# Patient Record
Sex: FEMALE | Race: WHITE | NOT HISPANIC OR LATINO | Employment: OTHER | URBAN - METROPOLITAN AREA
[De-identification: names, ages, dates, MRNs, and addresses within clinical notes are randomized per-mention and may not be internally consistent; named-entity substitution may affect disease eponyms.]

---

## 2017-11-30 ENCOUNTER — TRANSFERRED RECORDS (OUTPATIENT)
Dept: HEALTH INFORMATION MANAGEMENT | Facility: CLINIC | Age: 67
End: 2017-11-30

## 2017-12-06 ENCOUNTER — TRANSFERRED RECORDS (OUTPATIENT)
Dept: HEALTH INFORMATION MANAGEMENT | Facility: CLINIC | Age: 67
End: 2017-12-06

## 2017-12-18 ENCOUNTER — TRANSFERRED RECORDS (OUTPATIENT)
Dept: HEALTH INFORMATION MANAGEMENT | Facility: CLINIC | Age: 67
End: 2017-12-18

## 2017-12-27 ENCOUNTER — TRANSFERRED RECORDS (OUTPATIENT)
Dept: HEALTH INFORMATION MANAGEMENT | Facility: CLINIC | Age: 67
End: 2017-12-27

## 2017-12-27 PROCEDURE — 00000346 ZZHCL STATISTIC REVIEW OUTSIDE SLIDES TC 88321: Performed by: OBSTETRICS & GYNECOLOGY

## 2017-12-28 LAB — COPATH REPORT: NORMAL

## 2017-12-29 ENCOUNTER — TRANSFERRED RECORDS (OUTPATIENT)
Dept: HEALTH INFORMATION MANAGEMENT | Facility: CLINIC | Age: 67
End: 2017-12-29

## 2018-01-05 ENCOUNTER — ONCOLOGY VISIT (OUTPATIENT)
Dept: ONCOLOGY | Facility: CLINIC | Age: 68
End: 2018-01-05
Attending: OBSTETRICS & GYNECOLOGY
Payer: COMMERCIAL

## 2018-01-05 ENCOUNTER — APPOINTMENT (OUTPATIENT)
Dept: LAB | Facility: CLINIC | Age: 68
End: 2018-01-05

## 2018-01-05 VITALS
OXYGEN SATURATION: 97 % | HEIGHT: 64 IN | DIASTOLIC BLOOD PRESSURE: 75 MMHG | TEMPERATURE: 98.8 F | WEIGHT: 124.1 LBS | SYSTOLIC BLOOD PRESSURE: 126 MMHG | BODY MASS INDEX: 21.19 KG/M2 | HEART RATE: 80 BPM

## 2018-01-05 DIAGNOSIS — C55 UTERINE CARCINOSARCOMA (H): Primary | ICD-10-CM

## 2018-01-05 LAB
ALBUMIN SERPL-MCNC: 3.6 G/DL (ref 3.4–5)
ALP SERPL-CCNC: 79 U/L (ref 40–150)
ALT SERPL W P-5'-P-CCNC: 17 U/L (ref 0–50)
ANION GAP SERPL CALCULATED.3IONS-SCNC: 9 MMOL/L (ref 3–14)
AST SERPL W P-5'-P-CCNC: 17 U/L (ref 0–45)
BASOPHILS # BLD AUTO: 0.1 10E9/L (ref 0–0.2)
BASOPHILS NFR BLD AUTO: 0.7 %
BILIRUB SERPL-MCNC: 0.2 MG/DL (ref 0.2–1.3)
BUN SERPL-MCNC: 8 MG/DL (ref 7–30)
CALCIUM SERPL-MCNC: 8.8 MG/DL (ref 8.5–10.1)
CHLORIDE SERPL-SCNC: 107 MMOL/L (ref 94–109)
CO2 SERPL-SCNC: 22 MMOL/L (ref 20–32)
CREAT SERPL-MCNC: 0.53 MG/DL (ref 0.52–1.04)
DIFFERENTIAL METHOD BLD: ABNORMAL
EOSINOPHIL # BLD AUTO: 0.1 10E9/L (ref 0–0.7)
EOSINOPHIL NFR BLD AUTO: 0.8 %
ERYTHROCYTE [DISTWIDTH] IN BLOOD BY AUTOMATED COUNT: 12.3 % (ref 10–15)
GFR SERPL CREATININE-BSD FRML MDRD: >90 ML/MIN/1.7M2
GLUCOSE SERPL-MCNC: 157 MG/DL (ref 70–99)
HCT VFR BLD AUTO: 40.4 % (ref 35–47)
HGB BLD-MCNC: 13.6 G/DL (ref 11.7–15.7)
IMM GRANULOCYTES # BLD: 0 10E9/L (ref 0–0.4)
IMM GRANULOCYTES NFR BLD: 0.3 %
LYMPHOCYTES # BLD AUTO: 2 10E9/L (ref 0.8–5.3)
LYMPHOCYTES NFR BLD AUTO: 27.9 %
MCH RBC QN AUTO: 33.3 PG (ref 26.5–33)
MCHC RBC AUTO-ENTMCNC: 33.7 G/DL (ref 31.5–36.5)
MCV RBC AUTO: 99 FL (ref 78–100)
MONOCYTES # BLD AUTO: 0.5 10E9/L (ref 0–1.3)
MONOCYTES NFR BLD AUTO: 7.2 %
NEUTROPHILS # BLD AUTO: 4.5 10E9/L (ref 1.6–8.3)
NEUTROPHILS NFR BLD AUTO: 63.1 %
NRBC # BLD AUTO: 0 10*3/UL
NRBC BLD AUTO-RTO: 0 /100
PLATELET # BLD AUTO: 327 10E9/L (ref 150–450)
POTASSIUM SERPL-SCNC: 3.6 MMOL/L (ref 3.4–5.3)
PROT SERPL-MCNC: 7.4 G/DL (ref 6.8–8.8)
RBC # BLD AUTO: 4.08 10E12/L (ref 3.8–5.2)
SODIUM SERPL-SCNC: 138 MMOL/L (ref 133–144)
WBC # BLD AUTO: 7.2 10E9/L (ref 4–11)

## 2018-01-05 PROCEDURE — 80053 COMPREHEN METABOLIC PANEL: CPT | Performed by: OBSTETRICS & GYNECOLOGY

## 2018-01-05 PROCEDURE — G0463 HOSPITAL OUTPT CLINIC VISIT: HCPCS | Mod: ZF

## 2018-01-05 PROCEDURE — 99205 OFFICE O/P NEW HI 60 MIN: CPT | Mod: ZP | Performed by: OBSTETRICS & GYNECOLOGY

## 2018-01-05 PROCEDURE — 85025 COMPLETE CBC W/AUTO DIFF WBC: CPT | Performed by: OBSTETRICS & GYNECOLOGY

## 2018-01-05 RX ORDER — HEPARIN SODIUM 5000 [USP'U]/.5ML
5000 INJECTION, SOLUTION INTRAVENOUS; SUBCUTANEOUS
Status: CANCELLED | OUTPATIENT
Start: 2018-01-05

## 2018-01-05 RX ORDER — PHENAZOPYRIDINE HYDROCHLORIDE 200 MG/1
200 TABLET, FILM COATED ORAL ONCE
Status: CANCELLED | OUTPATIENT
Start: 2018-01-05 | End: 2018-01-05

## 2018-01-05 ASSESSMENT — PAIN SCALES - GENERAL: PAINLEVEL: NO PAIN (0)

## 2018-01-05 NOTE — MR AVS SNAPSHOT
After Visit Summary   1/5/2018    Cristin Ibarra    MRN: [id number]           Patient Information     Date Of Birth          1950        Visit Information        Provider Department      1/5/2018 12:00 PM Luis Hayes MD [facility]        Today's Diagnoses     Uterine carcinosarcoma (H)    -  1       Follow-ups after your visit        Your next 10 appointments already scheduled     Jan 12, 2018   Procedure with Luis Hayes MD   [facility], [place], Same Day Surgery (--)    [address]  [place] [address] [phone]              Who to contact     If you have questions or need follow up information about today's clinic visit or your schedule please contact [facility] directly at [phone].  Normal or non-critical lab and imaging results will be communicated to you by MyChart, letter or phone within 4 business days after the clinic has received the results. If you do not hear from us within 7 days, please contact the clinic through MyChart or phone. If you have a critical or abnormal lab result, we will notify you by phone as soon as possible.  Submit refill requests through [employer] or call your pharmacy and they will forward the refill request to us. Please allow 3 business days for your refill to be completed.          Additional Information About Your Visit        MyChart Information     [employer] gives you secure access to your electronic health record. If you see a primary care provider, you can also send messages to your care team and make appointments. If you have questions, please call your primary care clinic.  If you do not have a primary care provider, please call [phone] and they will assist you.        Care EveryWhere ID     This is your Care EveryWhere ID. This could be used by other organizations to access your [place] medical records  [id number]        Your Vitals Were     Pulse Temperature Height Pulse Oximetry  "Breastfeeding? BMI (Body Mass Index)    80 98.8  F (37.1  C) (Oral) 1.626 m (5' 4\") 97% No 21.3 kg/m2       Blood Pressure from Last 3 Encounters:   01/12/18 115/70   01/05/18 126/75    Weight from Last 3 Encounters:   01/12/18 55.7 kg (122 lb 12.7 oz)   01/05/18 56.3 kg (124 lb 1.6 oz)              We Performed the Following     CBC with platelets differential     CMP - Comprehensive Metabolic Panel     Marybel-Operative Worksheet - Erin Total Laparoscopic Hysterectomy, bilateral, Salpingo-Oophorectomy, cystoscopy, possible open, possible lymph node dissection        Primary Care Provider Fax #    Physician No Ref-Primary [phone]       [address] [address]        Equal Access to Services     MICHAEL SMALLWOOD : Kristin Kinney, wamaynor marquez, terell kaalmajosé cox, vicki courtney . So [facility] [phone].    ATENCIÓN: Si habla español, tiene a hayes disposición servicios gratuitos de asistencia lingüística. [facility] [phone].    We comply with applicable federal civil rights laws and [place] laws. We do not discriminate on the basis of race, color, national origin, age, disability, sex, sexual orientation, or gender identity.            Thank you!     Thank you for choosing [facility]  for your care. Our goal is always to provide you with excellent care. Hearing back from our patients is one way we can continue to improve our services. Please take a few minutes to complete the written survey that you may receive in the mail after your visit with us. Thank you!             Your Updated Medication List - Protect others around you: Learn how to safely use, store and throw away your medicines at www.disposemymeds.org.          This list is accurate as of: 1/5/18 11:59 PM.  Always use your most recent med list.                   Brand Name Dispense Instructions for use Diagnosis    CALCIUM/VITAMIN D PO      Take 1 " tablet by mouth daily        MULTIVITAMIN PO      Take 1 tablet by mouth daily        RA VITAMIN C/MAJO HIPS 1000 MG Tabs tablet   Generic drug:  ascorbic acid

## 2018-01-05 NOTE — LETTER
2018       RE: Cristin Ibarra  [address] [place] [address]     Dear Colleague,    Thank you for referring your patient, Cristin Ibarra, to the [facility]. Please see a copy of my visit note below.                            Consult Notes on Referred Patient    Date: 2018       Dr. Deborah Maldonado,   [facility]  [address]  [place] [address]       RE: Cristin Ibarra  : 1950  JEMAL: 2018    Dear Dr. Deborah Maldonado:    I had the pleasure of seeing your patient Cristin Ibarra here at the Gynecologic Cancer Clinic at the [facility] on 2018.  As you know she is a very pleasant 67 year old woman with a recent diagnosis of uterine carcinosarcoma.  Given these findings she was subsequently sent to the Gynecologic Cancer Clinic for new patient consultation.    After presenting to you with complaint of 3 months of post menopausal bleeding you performed endometrial biopsy showing carcinosarcoma.  Our review of the pathology at [facility] shows the same diagnosis.  She has had no associated symptoms and feels well.  Her bleeding hs subsided since your biopsy.      Review of Systems:    Systemic           no weight changes; no fever; no chills; no night sweats; no appetite changes  Skin           no rashes, or lesions  Eye           no irritation; no changes in vision  Clemencia-Laryngeal           no dysphagia; no hoarseness   Pulmonary    no cough; no shortness of breath  Cardiovascular    no chest pain; no palpitations  Gastrointestinal    no diarrhea; no constipation; no abdominal pain; no changes in bowel  habits; no blood in stool  Genitourinary   no urinary frequency; no urinary urgency; no dysuria; no pain; no abnormal vaginal discharge; she has abnormal vaginal bleeding as described above  Breast   no breast discharge; no breast changes; no breast pain  Musculoskeletal    no myalgias; no arthralgias; no back  "pain  Psychiatric           no depressed mood; no anxiety    Hematologic           no tender lymph nodes; no noticeable swellings or lumps   Endocrine    no hot flashes; no heat/cold intolerance         Neurological   no tremor; no numbness and tingling; no headaches; no difficulty  sleeping      Past Medical History:    No past medical history on file.      Past Surgical History:    No past surgical history on file.      Health Maintenance:  Health Maintenance Due   Topic Date Due     TETANUS IMMUNIZATION (SYSTEM ASSIGNED)  10/20/1970     HEPATITIS C SCREENING  10/20/1970     LIPID SCREEN Q5 YR FEMALE (SYSTEM ASSIGNED)  10/20/1997     MAMMO SCREEN Q2 YR (SYSTEM ASSIGNED)  10/20/2002     COLON CANCER SCREEN (SYSTEM ASSIGNED)  10/20/2002     ADVANCE DIRECTIVE PLANNING Q5 YRS  10/20/2007     INFLUENZA VACCINE (SYSTEM ASSIGNED)  09/01/2017     FALL RISK ASSESSMENT  10/20/2017     DEXA SCAN SCREENING (SYSTEM ASSIGNED)  10/20/2017     PNEUMOCOCCAL (1 of 2 - PCV13) 10/20/2017       Last Pap Smear: At this evaluation and it is co-test double negative            Current Medications:     has a current medication list which includes the following prescription(s): calcium carb-cholecalciferol, ascorbic acid, and multiple vitamins-minerals.       Allergies:     [unfilled]        Social History:     Social History   Substance Use Topics     Smoking status: Current Every Day Smoker     Smokeless tobacco: Never Used     Alcohol use Yes       History   Drug Use No           Family History:     The patient's family history is notable for    No family history on file.      Physical Exam:     /75  Pulse 80  Temp 98.8  F (37.1  C) (Oral)  Ht 1.626 m (5' 4\")  Wt 56.3 kg (124 lb 1.6 oz)  SpO2 97%  Breastfeeding? No  BMI 21.3 kg/m2  Body mass index is 21.3 kg/(m^2).    General Appearance: healthy and alert, no distress     HEENT:  no thyromegaly, no palpable nodules or masses        Cardiovascular: regular rate and rhythm, " no gallops, rubs or murmurs     Respiratory: lungs clear, no rales, rhonchi or wheezes, normal diaphragmatic excursion    Musculoskeletal: extremities non tender and without edema    Skin: no lesions or rashes     Neurological: normal gait, no gross defects     Psychiatric: appropriate mood and affect                               Hematological: normal cervical, supraclavicular and inguinal lymph nodes     Gastrointestinal:       abdomen soft, non-tender, non-distended, no organomegaly or masses    Genitourinary: External genitalia and urethral meatus appears normal.  Vagina is smooth without nodularity or masses.  Cervix appears normal and without lesions.  There is no blood in the vaginal vault, she is moderately uncomfortable during the examination.  Uterus is anterior and small, no masses are appreciated and no obvious evidence of metastatic cancer    CT Scan: please see record in EMR.  The uterus is small, there are cystic lesions in lliver and stable lesion at adrenal gland, no obvious metastatic cancer.   Uterine cavity is fluid filled uterus is small, ovaries are not commented on.    Assessment:    Cristin Ibarra is a 67 year old woman with a new diagnosis of uterine carcinosarcoma.     A total of 50 minutes was spent with the patient, 25 minutes of which were spent in counseling the patient and/or treatment planning.      Plan:     1.)   Plan for daVinci assisted TLH, BSO, Pelvic and Para aortic lymphadenectomy with omental biopsy if feasible with daVinci.  Discussed risks and benefits of surgery and answered all of the patient's and  Her 's questions.  We discussed the recovery time and the timing of post operative visit.  They had a few questions regarding need for treatment after surgery, I informed them that this will be based upon the pathology findings from the surgical specimens.     2.) Genetic risk factors were assessed and the patient does not meet the qualifications for a referral.       3.) Labs and/or tests ordered include:  As Per PAC.     4.) Health maintenance issues addressed today include: none.    5.) Pre-op teaching was completed today.  Risks of surgery were discussed to include: bleeding, transfusion, infection, unintentional injury to surrounding organs/structures.      Thank you for allowing us to participate in the care of your patient.         Sincerely,  Luis Hayes Jr., M.D., M.S.  Professor, Gynecologic Oncology  Obstetrics, Gynecology and Women's Health   [employer] [facility] [employer]  Chief Medical Officer  [facility] and [facility]  Patient Care Team:  No Ref-Primary, Physician as PCP - General  Deborah Maldonado DO

## 2018-01-05 NOTE — NURSING NOTE
"Oncology Rooming Note    January 5, 2018 11:35 AM   Cristin Ibarra is a 67 year old female who presents for:    Chief Complaint   Patient presents with     Oncology Clinic Visit     new patient visit consultation related to uterine carcinosarcoma      Initial Vitals: /75  Pulse 80  Temp 98.8  F (37.1  C) (Oral)  Ht 1.626 m (5' 4\")  Wt 56.3 kg (124 lb 1.6 oz)  SpO2 97%  Breastfeeding? No  BMI 21.3 kg/m2 Estimated body mass index is 21.3 kg/(m^2) as calculated from the following:    Height as of this encounter: 1.626 m (5' 4\").    Weight as of this encounter: 56.3 kg (124 lb 1.6 oz). Body surface area is 1.59 meters squared.  No Pain (0) Comment: Data Unavailable   No LMP recorded. Patient is postmenopausal.  Allergies reviewed: Yes  Medications reviewed: Yes    Medications: Medication refills not needed today.  Pharmacy name entered into EPIC: Data Unavailable    Clinical concerns: patient mentioned abdominal cramping and bleeding (on and off) that stated in November - takes tylenol for pain relief dr. melo was notified.    8 minutes for nursing intake (face to face time)     Az Moe CMA              "

## 2018-01-05 NOTE — PROGRESS NOTES
Consult Notes on Referred Patient    Date: 2018       Dr. Deborah Maldonado, DO  [facility]  [address]  [place] [address]       RE: Cristin Ibarra  : 1950  JEMAL: 2018    Dear Dr. Deborah Maldonado:    I had the pleasure of seeing your patient Cristin Ibarra here at the Gynecologic Cancer Clinic at the [facility] on 2018.  As you know she is a very pleasant 67 year old woman with a recent diagnosis of uterine carcinosarcoma.  Given these findings she was subsequently sent to the Gynecologic Cancer Clinic for new patient consultation.    After presenting to you with complaint of 3 months of post menopausal bleeding you performed endometrial biopsy showing carcinosarcoma.  Our review of the pathology at [facility] shows the same diagnosis.  She has had no associated symptoms and feels well.  Her bleeding hs subsided since your biopsy.      Review of Systems:    Systemic           no weight changes; no fever; no chills; no night sweats; no appetite changes  Skin           no rashes, or lesions  Eye           no irritation; no changes in vision  Clemencia-Laryngeal           no dysphagia; no hoarseness   Pulmonary    no cough; no shortness of breath  Cardiovascular    no chest pain; no palpitations  Gastrointestinal    no diarrhea; no constipation; no abdominal pain; no changes in bowel  habits; no blood in stool  Genitourinary   no urinary frequency; no urinary urgency; no dysuria; no pain; no abnormal vaginal discharge; she has abnormal vaginal bleeding as described above  Breast   no breast discharge; no breast changes; no breast pain  Musculoskeletal    no myalgias; no arthralgias; no back pain  Psychiatric           no depressed mood; no anxiety    Hematologic           no tender lymph nodes; no noticeable swellings or lumps   Endocrine    no hot flashes; no heat/cold intolerance         Neurological   no tremor; no numbness and tingling; no  "headaches; no difficulty  sleeping      Past Medical History:    No past medical history on file.      Past Surgical History:    No past surgical history on file.      Health Maintenance:  Health Maintenance Due   Topic Date Due     TETANUS IMMUNIZATION (SYSTEM ASSIGNED)  10/20/1970     HEPATITIS C SCREENING  10/20/1970     LIPID SCREEN Q5 YR FEMALE (SYSTEM ASSIGNED)  10/20/1997     MAMMO SCREEN Q2 YR (SYSTEM ASSIGNED)  10/20/2002     COLON CANCER SCREEN (SYSTEM ASSIGNED)  10/20/2002     ADVANCE DIRECTIVE PLANNING Q5 YRS  10/20/2007     INFLUENZA VACCINE (SYSTEM ASSIGNED)  09/01/2017     FALL RISK ASSESSMENT  10/20/2017     DEXA SCAN SCREENING (SYSTEM ASSIGNED)  10/20/2017     PNEUMOCOCCAL (1 of 2 - PCV13) 10/20/2017       Last Pap Smear: At this evaluation and it is co-test double negative            Current Medications:     has a current medication list which includes the following prescription(s): calcium carb-cholecalciferol, ascorbic acid, and multiple vitamins-minerals.       Allergies:     [unfilled]        Social History:     Social History   Substance Use Topics     Smoking status: Current Every Day Smoker     Smokeless tobacco: Never Used     Alcohol use Yes       History   Drug Use No           Family History:     The patient's family history is notable for    No family history on file.      Physical Exam:     /75  Pulse 80  Temp 98.8  F (37.1  C) (Oral)  Ht 1.626 m (5' 4\")  Wt 56.3 kg (124 lb 1.6 oz)  SpO2 97%  Breastfeeding? No  BMI 21.3 kg/m2  Body mass index is 21.3 kg/(m^2).    General Appearance: healthy and alert, no distress     HEENT:  no thyromegaly, no palpable nodules or masses        Cardiovascular: regular rate and rhythm, no gallops, rubs or murmurs     Respiratory: lungs clear, no rales, rhonchi or wheezes, normal diaphragmatic excursion    Musculoskeletal: extremities non tender and without edema    Skin: no lesions or rashes     Neurological: normal gait, no gross " defects     Psychiatric: appropriate mood and affect                               Hematological: normal cervical, supraclavicular and inguinal lymph nodes     Gastrointestinal:       abdomen soft, non-tender, non-distended, no organomegaly or masses    Genitourinary: External genitalia and urethral meatus appears normal.  Vagina is smooth without nodularity or masses.  Cervix appears normal and without lesions.  There is no blood in the vaginal vault, she is moderately uncomfortable during the examination.  Uterus is anterior and small, no masses are appreciated and no obvious evidence of metastatic cancer    CT Scan: please see record in EMR.  The uterus is small, there are cystic lesions in lliver and stable lesion at adrenal gland, no obvious metastatic cancer.   Uterine cavity is fluid filled uterus is small, ovaries are not commented on.    Assessment:    Cristin Ibarra is a 67 year old woman with a new diagnosis of uterine carcinosarcoma.     A total of 50 minutes was spent with the patient, 25 minutes of which were spent in counseling the patient and/or treatment planning.      Plan:     1.)   Plan for daVinci assisted TLH, BSO, Pelvic and Para aortic lymphadenectomy with omental biopsy if feasible with daVinci.  Discussed risks and benefits of surgery and answered all of the patient's and  Her 's questions.  We discussed the recovery time and the timing of post operative visit.  They had a few questions regarding need for treatment after surgery, I informed them that this will be based upon the pathology findings from the surgical specimens.     2.) Genetic risk factors were assessed and the patient does not meet the qualifications for a referral.      3.) Labs and/or tests ordered include:  As Per PAC.     4.) Health maintenance issues addressed today include: none.    5.) Pre-op teaching was completed today.  Risks of surgery were discussed to include: bleeding, transfusion, infection,  unintentional injury to surrounding organs/structures.      Thank you for allowing us to participate in the care of your patient.         Sincerely,  Luis Hayes Jr., M.D., M.S.  Professor, Gynecologic Oncology  Obstetrics, Gynecology and Women's Health   [employer] [facility] [employer]  Chief Medical Officer  [facility] and [facility]  Patient Care Team:  No Ref-Primary, Physician as PCP - General  Danika Maldonado, DANIKA BOSE

## 2018-01-08 ENCOUNTER — TRANSFERRED RECORDS (OUTPATIENT)
Dept: HEALTH INFORMATION MANAGEMENT | Facility: CLINIC | Age: 68
End: 2018-01-08

## 2018-01-09 NOTE — NURSING NOTE
Pre Op Nurse Ryan whalen Template    Relevant Diagnosis: uterine carcinosarcoma    Teaching Topic:  davinci assisted removal of uterus tubes ovaries cervix omentectomy, pelvic and para aortic lymph node dissection , cystoscopy possible open abdomen    Person(s) involved in teaching :  Patient  & daughter  Motivation Level:  Asks Questions:    Yes      Eager to Learn:     Yes     Cooperative:          Yes    Receptive (willing. Able to accept information):    Yes      Patient and those who are listed above demonstrates understanding of the following:   Reason for the appointment, diagnosis and treatment plan:   Yes   Knowledge of proper use of medications and conditions for which they are ordered (with special attention to potential side effects or drug interactions): Yes   Which situations necessitate calling provider and whom to contact: Yes         Nutritional needs and diet plan:  Yes      Pain management techniques:     Yes, Pain Scale   Diet:   Yes, [facility] Diet Instructions    Teaching Concerns addressed: Yes    Infection Prevention:  Patient and those who are listed above demonstrate understanding of the following:  Pre-Op CHG Bathing Instructions: Yes  Surgical procedure site care taught:   Yes   Signs and symptoms of infection taught: Yes       Instructional Materials Used/Given:  The [facility] Before You Surgery Booklet  Showering or Bathing before Surgery Instructions & CHG Product  Hysterectomy Guidelines  Pain Assessment Tool   Home Care after Major Abdominal or Vaginal Surgery  Map  Accommodations Brochure  Phone numbers for [facility] and [facility]  Copy of Surgical Consent    Done Today:  Lab work,   Preop Visit needed with PCP    Tests Ordered:  Post Op Visit Scheduled:  tbd  Comments:    Surgery date/time:1/12    Consent to file in medical records  .

## 2018-01-11 ENCOUNTER — ANESTHESIA EVENT (OUTPATIENT)
Dept: SURGERY | Facility: CLINIC | Age: 68
End: 2018-01-11
Payer: COMMERCIAL

## 2018-01-11 ASSESSMENT — LIFESTYLE VARIABLES: TOBACCO_USE: 1

## 2018-01-12 ENCOUNTER — HOSPITAL ENCOUNTER (OUTPATIENT)
Facility: CLINIC | Age: 68
Discharge: HOME | End: 2018-01-12
Attending: OBSTETRICS & GYNECOLOGY | Admitting: OBSTETRICS & GYNECOLOGY
Payer: COMMERCIAL

## 2018-01-12 ENCOUNTER — SURGERY (OUTPATIENT)
Age: 68
End: 2018-01-12
Payer: COMMERCIAL

## 2018-01-12 ENCOUNTER — ANESTHESIA (OUTPATIENT)
Dept: SURGERY | Facility: CLINIC | Age: 68
End: 2018-01-12
Payer: COMMERCIAL

## 2018-01-12 VITALS
RESPIRATION RATE: 18 BRPM | OXYGEN SATURATION: 95 % | SYSTOLIC BLOOD PRESSURE: 121 MMHG | BODY MASS INDEX: 20.46 KG/M2 | DIASTOLIC BLOOD PRESSURE: 73 MMHG | HEIGHT: 65 IN | WEIGHT: 122.8 LBS | TEMPERATURE: 97.9 F

## 2018-01-12 DIAGNOSIS — Z90.710 S/P COMPLETE HYSTERECTOMY: Primary | ICD-10-CM

## 2018-01-12 LAB
ABO + RH BLD: NORMAL
ABO + RH BLD: NORMAL
BLD GP AB SCN SERPL QL: NORMAL
BLOOD BANK CMNT PATIENT-IMP: NORMAL
GLUCOSE BLDC GLUCOMTR-MCNC: 104 MG/DL (ref 70–99)
SPECIMEN EXP DATE BLD: NORMAL

## 2018-01-12 PROCEDURE — 86901 BLOOD TYPING SEROLOGIC RH(D): CPT | Performed by: STUDENT IN AN ORGANIZED HEALTH CARE EDUCATION/TRAINING PROGRAM

## 2018-01-12 PROCEDURE — C9399 UNCLASSIFIED DRUGS OR BIOLOG: HCPCS | Performed by: NURSE ANESTHETIST, CERTIFIED REGISTERED

## 2018-01-12 PROCEDURE — C9290 INJ, BUPIVACAINE LIPOSOME: HCPCS | Performed by: ANESTHESIOLOGY

## 2018-01-12 PROCEDURE — 25000125 ZZHC RX 250: Performed by: STUDENT IN AN ORGANIZED HEALTH CARE EDUCATION/TRAINING PROGRAM

## 2018-01-12 PROCEDURE — 27210794 ZZH OR GENERAL SUPPLY STERILE: Performed by: OBSTETRICS & GYNECOLOGY

## 2018-01-12 PROCEDURE — 37000008 ZZH ANESTHESIA TECHNICAL FEE, 1ST 30 MIN: Performed by: OBSTETRICS & GYNECOLOGY

## 2018-01-12 PROCEDURE — 82962 GLUCOSE BLOOD TEST: CPT

## 2018-01-12 PROCEDURE — 25000128 H RX IP 250 OP 636: Performed by: ANESTHESIOLOGY

## 2018-01-12 PROCEDURE — 71000027 ZZH RECOVERY PHASE 2 EACH 15 MINS: Performed by: OBSTETRICS & GYNECOLOGY

## 2018-01-12 PROCEDURE — 58571 TLH W/T/O 250 G OR LESS: CPT | Mod: GC | Performed by: OBSTETRICS & GYNECOLOGY

## 2018-01-12 PROCEDURE — 88307 TISSUE EXAM BY PATHOLOGIST: CPT | Performed by: OBSTETRICS & GYNECOLOGY

## 2018-01-12 PROCEDURE — 88342 IMHCHEM/IMCYTCHM 1ST ANTB: CPT | Performed by: OBSTETRICS & GYNECOLOGY

## 2018-01-12 PROCEDURE — 88305 TISSUE EXAM BY PATHOLOGIST: CPT | Performed by: OBSTETRICS & GYNECOLOGY

## 2018-01-12 PROCEDURE — 36000088 ZZH SURGERY LEVEL 8 EA 15 ADDTL MIN - UMMC: Performed by: OBSTETRICS & GYNECOLOGY

## 2018-01-12 PROCEDURE — 88341 IMHCHEM/IMCYTCHM EA ADD ANTB: CPT | Performed by: OBSTETRICS & GYNECOLOGY

## 2018-01-12 PROCEDURE — 25000125 ZZHC RX 250: Performed by: NURSE ANESTHETIST, CERTIFIED REGISTERED

## 2018-01-12 PROCEDURE — 36415 COLL VENOUS BLD VENIPUNCTURE: CPT | Performed by: STUDENT IN AN ORGANIZED HEALTH CARE EDUCATION/TRAINING PROGRAM

## 2018-01-12 PROCEDURE — A9270 NON-COVERED ITEM OR SERVICE: HCPCS | Mod: GY | Performed by: ANESTHESIOLOGY

## 2018-01-12 PROCEDURE — A9270 NON-COVERED ITEM OR SERVICE: HCPCS | Mod: GY | Performed by: OBSTETRICS & GYNECOLOGY

## 2018-01-12 PROCEDURE — 40000275 ZZH STATISTIC RCP TIME EA 10 MIN

## 2018-01-12 PROCEDURE — 25000128 H RX IP 250 OP 636: Performed by: OBSTETRICS & GYNECOLOGY

## 2018-01-12 PROCEDURE — 71000015 ZZH RECOVERY PHASE 1 LEVEL 2 EA ADDTL HR: Performed by: OBSTETRICS & GYNECOLOGY

## 2018-01-12 PROCEDURE — 25000132 ZZH RX MED GY IP 250 OP 250 PS 637: Mod: GY | Performed by: OBSTETRICS & GYNECOLOGY

## 2018-01-12 PROCEDURE — 93005 ELECTROCARDIOGRAM TRACING: CPT

## 2018-01-12 PROCEDURE — 88309 TISSUE EXAM BY PATHOLOGIST: CPT | Performed by: OBSTETRICS & GYNECOLOGY

## 2018-01-12 PROCEDURE — A9270 NON-COVERED ITEM OR SERVICE: HCPCS | Performed by: OBSTETRICS & GYNECOLOGY

## 2018-01-12 PROCEDURE — 25000132 ZZH RX MED GY IP 250 OP 250 PS 637: Mod: GY | Performed by: ANESTHESIOLOGY

## 2018-01-12 PROCEDURE — 25000128 H RX IP 250 OP 636: Performed by: NURSE ANESTHETIST, CERTIFIED REGISTERED

## 2018-01-12 PROCEDURE — 00000155 ZZHCL STATISTIC H-CELL BLOCK W/STAIN: Performed by: OBSTETRICS & GYNECOLOGY

## 2018-01-12 PROCEDURE — 25000125 ZZHC RX 250: Performed by: OBSTETRICS & GYNECOLOGY

## 2018-01-12 PROCEDURE — 38572 LAPAROSCOPY LYMPHADENECTOMY: CPT | Mod: GC | Performed by: OBSTETRICS & GYNECOLOGY

## 2018-01-12 PROCEDURE — 40000170 ZZH STATISTIC PRE-PROCEDURE ASSESSMENT II: Performed by: OBSTETRICS & GYNECOLOGY

## 2018-01-12 PROCEDURE — 36000086 ZZH SURGERY LEVEL 8 1ST 30 MIN UMMC: Performed by: OBSTETRICS & GYNECOLOGY

## 2018-01-12 PROCEDURE — 88112 CYTOPATH CELL ENHANCE TECH: CPT | Performed by: OBSTETRICS & GYNECOLOGY

## 2018-01-12 PROCEDURE — 37000009 ZZH ANESTHESIA TECHNICAL FEE, EACH ADDTL 15 MIN: Performed by: OBSTETRICS & GYNECOLOGY

## 2018-01-12 PROCEDURE — 86850 RBC ANTIBODY SCREEN: CPT | Performed by: STUDENT IN AN ORGANIZED HEALTH CARE EDUCATION/TRAINING PROGRAM

## 2018-01-12 PROCEDURE — 40000065 ZZH STATISTIC EKG NON-CHARGEABLE

## 2018-01-12 PROCEDURE — 25000566 ZZH SEVOFLURANE, EA 15 MIN: Performed by: OBSTETRICS & GYNECOLOGY

## 2018-01-12 PROCEDURE — 86900 BLOOD TYPING SEROLOGIC ABO: CPT | Performed by: STUDENT IN AN ORGANIZED HEALTH CARE EDUCATION/TRAINING PROGRAM

## 2018-01-12 PROCEDURE — 71000014 ZZH RECOVERY PHASE 1 LEVEL 2 FIRST HR: Performed by: OBSTETRICS & GYNECOLOGY

## 2018-01-12 RX ORDER — NALOXONE HYDROCHLORIDE 0.4 MG/ML
.1-.4 INJECTION, SOLUTION INTRAMUSCULAR; INTRAVENOUS; SUBCUTANEOUS
Status: DISCONTINUED | OUTPATIENT
Start: 2018-01-12 | End: 2018-01-12 | Stop reason: HOSPADM

## 2018-01-12 RX ORDER — CEFAZOLIN SODIUM 1 G/3ML
1 INJECTION, POWDER, FOR SOLUTION INTRAMUSCULAR; INTRAVENOUS SEE ADMIN INSTRUCTIONS
Status: DISCONTINUED | OUTPATIENT
Start: 2018-01-12 | End: 2018-01-12 | Stop reason: HOSPADM

## 2018-01-12 RX ORDER — SODIUM CHLORIDE, SODIUM LACTATE, POTASSIUM CHLORIDE, CALCIUM CHLORIDE 600; 310; 30; 20 MG/100ML; MG/100ML; MG/100ML; MG/100ML
INJECTION, SOLUTION INTRAVENOUS CONTINUOUS
Status: DISCONTINUED | OUTPATIENT
Start: 2018-01-12 | End: 2018-01-12 | Stop reason: HOSPADM

## 2018-01-12 RX ORDER — ONDANSETRON 4 MG/1
4 TABLET, ORALLY DISINTEGRATING ORAL
Status: DISCONTINUED | OUTPATIENT
Start: 2018-01-12 | End: 2018-01-12 | Stop reason: HOSPADM

## 2018-01-12 RX ORDER — BUPIVACAINE HYDROCHLORIDE 2.5 MG/ML
INJECTION, SOLUTION EPIDURAL; INFILTRATION; INTRACAUDAL PRN
Status: DISCONTINUED | OUTPATIENT
Start: 2018-01-12 | End: 2018-01-12

## 2018-01-12 RX ORDER — FLUMAZENIL 0.1 MG/ML
0.2 INJECTION, SOLUTION INTRAVENOUS
Status: DISCONTINUED | OUTPATIENT
Start: 2018-01-12 | End: 2018-01-12 | Stop reason: HOSPADM

## 2018-01-12 RX ORDER — ACETAMINOPHEN 10 MG/ML
1000 INJECTION, SOLUTION INTRAVENOUS ONCE
Status: COMPLETED | OUTPATIENT
Start: 2018-01-12 | End: 2018-01-12

## 2018-01-12 RX ORDER — PHENAZOPYRIDINE HYDROCHLORIDE 200 MG/1
200 TABLET, FILM COATED ORAL ONCE
Status: COMPLETED | OUTPATIENT
Start: 2018-01-12 | End: 2018-01-12

## 2018-01-12 RX ORDER — OXYCODONE HYDROCHLORIDE 5 MG/1
10 TABLET ORAL EVERY 4 HOURS PRN
Status: CANCELLED | OUTPATIENT
Start: 2018-01-12

## 2018-01-12 RX ORDER — OXYCODONE HYDROCHLORIDE 5 MG/1
5-10 TABLET ORAL
Qty: 30 TABLET | Refills: 0 | Status: SHIPPED | OUTPATIENT
Start: 2018-01-12 | End: 2023-09-11

## 2018-01-12 RX ORDER — ONDANSETRON 4 MG/1
4 TABLET, ORALLY DISINTEGRATING ORAL EVERY 30 MIN PRN
Status: DISCONTINUED | OUTPATIENT
Start: 2018-01-12 | End: 2018-01-12 | Stop reason: HOSPADM

## 2018-01-12 RX ORDER — IBUPROFEN 600 MG/1
600 TABLET, FILM COATED ORAL EVERY 6 HOURS PRN
Qty: 30 TABLET | Refills: 0 | Status: SHIPPED | OUTPATIENT
Start: 2018-01-12

## 2018-01-12 RX ORDER — OXYCODONE HYDROCHLORIDE 5 MG/1
5-10 TABLET ORAL ONCE
Status: COMPLETED | OUTPATIENT
Start: 2018-01-12 | End: 2018-01-12

## 2018-01-12 RX ORDER — AMOXICILLIN 250 MG
1-2 CAPSULE ORAL 2 TIMES DAILY
Qty: 30 TABLET | Refills: 0 | Status: SHIPPED | OUTPATIENT
Start: 2018-01-12

## 2018-01-12 RX ORDER — DEXAMETHASONE SODIUM PHOSPHATE 10 MG/ML
INJECTION, SOLUTION INTRAMUSCULAR; INTRAVENOUS PRN
Status: DISCONTINUED | OUTPATIENT
Start: 2018-01-12 | End: 2018-01-12

## 2018-01-12 RX ORDER — LIDOCAINE HYDROCHLORIDE 20 MG/ML
INJECTION, SOLUTION INFILTRATION; PERINEURAL PRN
Status: DISCONTINUED | OUTPATIENT
Start: 2018-01-12 | End: 2018-01-12

## 2018-01-12 RX ORDER — EPHEDRINE SULFATE 50 MG/ML
INJECTION, SOLUTION INTRAMUSCULAR; INTRAVENOUS; SUBCUTANEOUS PRN
Status: DISCONTINUED | OUTPATIENT
Start: 2018-01-12 | End: 2018-01-12

## 2018-01-12 RX ORDER — PROPOFOL 10 MG/ML
INJECTION, EMULSION INTRAVENOUS PRN
Status: DISCONTINUED | OUTPATIENT
Start: 2018-01-12 | End: 2018-01-12

## 2018-01-12 RX ORDER — KETOROLAC TROMETHAMINE 30 MG/ML
30 INJECTION, SOLUTION INTRAMUSCULAR; INTRAVENOUS ONCE
Status: COMPLETED | OUTPATIENT
Start: 2018-01-12 | End: 2018-01-12

## 2018-01-12 RX ORDER — IBUPROFEN 200 MG
600 TABLET ORAL
Status: DISCONTINUED | OUTPATIENT
Start: 2018-01-12 | End: 2018-01-12 | Stop reason: HOSPADM

## 2018-01-12 RX ORDER — HEPARIN SODIUM 5000 [USP'U]/.5ML
5000 INJECTION, SOLUTION INTRAVENOUS; SUBCUTANEOUS
Status: COMPLETED | OUTPATIENT
Start: 2018-01-12 | End: 2018-01-12

## 2018-01-12 RX ORDER — OXYCODONE AND ACETAMINOPHEN 5; 325 MG/1; MG/1
1 TABLET ORAL
Status: DISCONTINUED | OUTPATIENT
Start: 2018-01-12 | End: 2018-01-12 | Stop reason: HOSPADM

## 2018-01-12 RX ORDER — CEFAZOLIN SODIUM 2 G/100ML
2 INJECTION, SOLUTION INTRAVENOUS
Status: COMPLETED | OUTPATIENT
Start: 2018-01-12 | End: 2018-01-12

## 2018-01-12 RX ORDER — ONDANSETRON 2 MG/ML
4 INJECTION INTRAMUSCULAR; INTRAVENOUS EVERY 30 MIN PRN
Status: DISCONTINUED | OUTPATIENT
Start: 2018-01-12 | End: 2018-01-12 | Stop reason: HOSPADM

## 2018-01-12 RX ORDER — ACETAMINOPHEN 325 MG/1
975 TABLET ORAL ONCE
Status: COMPLETED | OUTPATIENT
Start: 2018-01-12 | End: 2018-01-12

## 2018-01-12 RX ORDER — ACETAMINOPHEN 325 MG/1
650 TABLET ORAL EVERY 4 HOURS PRN
Qty: 30 TABLET | Refills: 0 | Status: SHIPPED | OUTPATIENT
Start: 2018-01-12

## 2018-01-12 RX ORDER — ONDANSETRON 2 MG/ML
INJECTION INTRAMUSCULAR; INTRAVENOUS PRN
Status: DISCONTINUED | OUTPATIENT
Start: 2018-01-12 | End: 2018-01-12

## 2018-01-12 RX ORDER — LIDOCAINE 40 MG/G
CREAM TOPICAL
Status: DISCONTINUED | OUTPATIENT
Start: 2018-01-12 | End: 2018-01-12 | Stop reason: HOSPADM

## 2018-01-12 RX ORDER — CELECOXIB 50 MG/1
100 CAPSULE ORAL ONCE
Status: COMPLETED | OUTPATIENT
Start: 2018-01-12 | End: 2018-01-12

## 2018-01-12 RX ORDER — FENTANYL CITRATE 50 UG/ML
25-50 INJECTION, SOLUTION INTRAMUSCULAR; INTRAVENOUS
Status: DISCONTINUED | OUTPATIENT
Start: 2018-01-12 | End: 2018-01-12 | Stop reason: HOSPADM

## 2018-01-12 RX ADMIN — KETOROLAC TROMETHAMINE 30 MG: 30 INJECTION, SOLUTION INTRAMUSCULAR at 16:13

## 2018-01-12 RX ADMIN — HYDROMORPHONE HYDROCHLORIDE 0.5 MG: 1 INJECTION, SOLUTION INTRAMUSCULAR; INTRAVENOUS; SUBCUTANEOUS at 08:45

## 2018-01-12 RX ADMIN — CONJUGATED ESTROGENS 1 G: 0.62 CREAM VAGINAL at 12:37

## 2018-01-12 RX ADMIN — LIDOCAINE HYDROCHLORIDE 40 MG: 20 INJECTION, SOLUTION INFILTRATION; PERINEURAL at 08:02

## 2018-01-12 RX ADMIN — MIDAZOLAM 2 MG: 1 INJECTION INTRAMUSCULAR; INTRAVENOUS at 07:37

## 2018-01-12 RX ADMIN — ACETAMINOPHEN 1000 MG: 10 INJECTION, SOLUTION INTRAVENOUS at 14:21

## 2018-01-12 RX ADMIN — OXYCODONE HYDROCHLORIDE 10 MG: 5 TABLET ORAL at 15:53

## 2018-01-12 RX ADMIN — ONDANSETRON 4 MG: 2 INJECTION INTRAMUSCULAR; INTRAVENOUS at 08:15

## 2018-01-12 RX ADMIN — SUGAMMADEX 110 MG: 100 INJECTION, SOLUTION INTRAVENOUS at 12:15

## 2018-01-12 RX ADMIN — ACETAMINOPHEN 975 MG: 325 TABLET, FILM COATED ORAL at 06:13

## 2018-01-12 RX ADMIN — ROCURONIUM BROMIDE 50 MG: 10 INJECTION INTRAVENOUS at 08:02

## 2018-01-12 RX ADMIN — CELECOXIB 100 MG: 50 CAPSULE ORAL at 06:13

## 2018-01-12 RX ADMIN — PHENYLEPHRINE HYDROCHLORIDE 100 MCG: 10 INJECTION, SOLUTION INTRAMUSCULAR; INTRAVENOUS; SUBCUTANEOUS at 08:07

## 2018-01-12 RX ADMIN — FENTANYL CITRATE 50 MCG: 50 INJECTION, SOLUTION INTRAMUSCULAR; INTRAVENOUS at 08:45

## 2018-01-12 RX ADMIN — HYDROMORPHONE HYDROCHLORIDE 0.4 MG: 1 INJECTION, SOLUTION INTRAMUSCULAR; INTRAVENOUS; SUBCUTANEOUS at 13:45

## 2018-01-12 RX ADMIN — HEPARIN SODIUM 5000 UNITS: 10000 INJECTION, SOLUTION INTRAVENOUS; SUBCUTANEOUS at 08:12

## 2018-01-12 RX ADMIN — BUPIVACAINE 20 ML: 13.3 INJECTION, SUSPENSION, LIPOSOMAL INFILTRATION at 08:10

## 2018-01-12 RX ADMIN — FENTANYL CITRATE 200 MCG: 50 INJECTION, SOLUTION INTRAMUSCULAR; INTRAVENOUS at 08:02

## 2018-01-12 RX ADMIN — CEFAZOLIN 1 G: 1 INJECTION, POWDER, FOR SOLUTION INTRAMUSCULAR; INTRAVENOUS at 12:15

## 2018-01-12 RX ADMIN — ROCURONIUM BROMIDE 20 MG: 10 INJECTION INTRAVENOUS at 09:48

## 2018-01-12 RX ADMIN — ROCURONIUM BROMIDE 30 MG: 10 INJECTION INTRAVENOUS at 08:44

## 2018-01-12 RX ADMIN — SODIUM CHLORIDE, POTASSIUM CHLORIDE, SODIUM LACTATE AND CALCIUM CHLORIDE: 600; 310; 30; 20 INJECTION, SOLUTION INTRAVENOUS at 11:16

## 2018-01-12 RX ADMIN — ROCURONIUM BROMIDE 20 MG: 10 INJECTION INTRAVENOUS at 11:11

## 2018-01-12 RX ADMIN — Medication 5 MG: at 08:07

## 2018-01-12 RX ADMIN — SODIUM CHLORIDE, POTASSIUM CHLORIDE, SODIUM LACTATE AND CALCIUM CHLORIDE: 600; 310; 30; 20 INJECTION, SOLUTION INTRAVENOUS at 07:37

## 2018-01-12 RX ADMIN — ONDANSETRON 4 MG: 2 INJECTION INTRAMUSCULAR; INTRAVENOUS at 13:21

## 2018-01-12 RX ADMIN — DEXAMETHASONE SODIUM PHOSPHATE 8 MG: 10 INJECTION, SOLUTION INTRAMUSCULAR; INTRAVENOUS at 08:15

## 2018-01-12 RX ADMIN — HYDROMORPHONE HYDROCHLORIDE 0.5 MG: 1 INJECTION, SOLUTION INTRAMUSCULAR; INTRAVENOUS; SUBCUTANEOUS at 09:42

## 2018-01-12 RX ADMIN — HYDROMORPHONE HYDROCHLORIDE 0.3 MG: 1 INJECTION, SOLUTION INTRAMUSCULAR; INTRAVENOUS; SUBCUTANEOUS at 13:30

## 2018-01-12 RX ADMIN — PROPOFOL 120 MG: 10 INJECTION, EMULSION INTRAVENOUS at 08:02

## 2018-01-12 RX ADMIN — PHENAZOPYRIDINE 200 MG: 200 TABLET ORAL at 06:13

## 2018-01-12 RX ADMIN — CEFAZOLIN SODIUM 2 G: 2 INJECTION, SOLUTION INTRAVENOUS at 08:15

## 2018-01-12 RX ADMIN — BUPIVACAINE HYDROCHLORIDE 20 ML: 2.5 INJECTION, SOLUTION EPIDURAL; INFILTRATION; INTRACAUDAL at 08:10

## 2018-01-12 RX ADMIN — HYDROMORPHONE HYDROCHLORIDE 0.3 MG: 1 INJECTION, SOLUTION INTRAMUSCULAR; INTRAVENOUS; SUBCUTANEOUS at 13:20

## 2018-01-12 RX ADMIN — CEFAZOLIN SODIUM 1 G: 2 INJECTION, SOLUTION INTRAVENOUS at 10:11

## 2018-01-12 NOTE — OR NURSING
Pt is having vaginal drainage that currently is yellow-pink. She is wearing a pad. She also has an aversion to needles. She would like the block to be done when she is in the OR.

## 2018-01-12 NOTE — PROGRESS NOTES
Dr. Knight at bedside, midline abdominal dressing changed. Premarin cream to be used nightly for 2 weeks. Patient okay to discharge home if she can void.

## 2018-01-12 NOTE — ANESTHESIA CARE TRANSFER NOTE
Patient: Cristin Ibarra    Procedure(s):  DaVinci Total Laparoscopic Hysterectomy, Bilateral Salpingo-Oophorectomy, Cystoscopy, Paraaortic and bilateral pelvic Lymph Node Dissection, Omental biopsy, episiotomy repair, pelvic washings, Anesthesia Block - Wound Class: II-Clean Contaminated   - Wound Class: II-Clean Contaminated   - Wound Class: II-Clean Contaminated    Diagnosis: Uterine Carcinosarcoma   Diagnosis Additional Information: No value filed.    Anesthesia Type:   General     Note:  Airway :Face Mask  Patient transferred to:PACU  Comments: Pt. Pink and breathing spontaneously.  Vitals stable.  Report given to oncoming nurse.  Transfer of care occurred.Handoff Report: Identifed the Patient, Identified the Reponsible Provider, Reviewed the pertinent medical history, Discussed the surgical course, Reviewed Intra-OP anesthesia mangement and issues during anesthesia, Set expectations for post-procedure period and Allowed opportunity for questions and acknowledgement of understanding      Vitals: (Last set prior to Anesthesia Care Transfer)    CRNA VITALS  1/12/2018 1217 - 1/12/2018 1258      1/12/2018             Resp Rate (observed): (!)  1                Electronically Signed By: LINDSAY Franco CRNA  January 12, 2018  12:58 PM

## 2018-01-12 NOTE — IP AVS SNAPSHOT
Post Anesthesia Care Unit [address] [address]    Phone:  [phone]                                       After Visit Summary   1/12/2018    Cristin Ibarra    MRN: [id number]           After Visit Summary Signature Page     I have received my discharge instructions, and my questions have been answered. I have discussed any challenges I see with this plan with the nurse or doctor.    ..........................................................................................................................................  Patient/Patient Representative Signature      ..........................................................................................................................................  Patient Representative Print Name and Relationship to Patient    ..................................................               ................................................  Date                                            Time    ..........................................................................................................................................  Reviewed by Signature/Title    ...................................................              ..............................................  Date                                                            Time

## 2018-01-12 NOTE — H&P
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 She has a history of vaginal bleeding ongoing since November and an endometrial biopsy on 12/27 was concerning for carcinosarcoma. Today she is feeling well.     ROS: Denies lightheadedness, dizziness, SOB, chest pain, palpitations, nausea, vomiting, abdominal pain, dysuria, constipation, diarrhea, LE pain or swelling.    PMH:   Past Medical History:   Diagnosis Date     Hyperlipidemia      Irritable bowel syndrome      Malignant neoplasm of female breast (H) 2007    Invasive ductal carcinoma, s/p chemo, radical mastectomy        PSHx:   Past Surgical History:   Procedure Laterality Date     FRACTURE SURGERY       MASTECTOMY MODIFIED RADICAL  2007     TUBAL LIGATION         Medications: Multivitamins     Allergies:    No Known Allergies    Social History: =  Social History     Social History     Marital status:      Spouse name: N/A     Number of children: N/A     Years of education: N/A     Occupational History     Not on file.     Social History Main Topics     Smoking status: Current Every Day Smoker     Smokeless tobacco: Never Used     Alcohol use Yes     Drug use: No     Sexual activity: Not on file     Other Topics Concern     Not on file     Social History Narrative     Social History     Social History     Marital status:      Spouse name: N/A     Number of children: N/A     Years of education: N/A     Social History Main Topics     Smoking status: Current Every Day Smoker     Smokeless tobacco: Never Used     Alcohol use Yes     Drug use: No     Sexual activity: Not Asked     Other Topics Concern     None     Social History Narrative     Physical Exam:   Vitals:    01/12/18 0539   BP: 115/70   Resp: 16   Temp: 98  F (36.7  C)   TempSrc: Oral   SpO2: 99%   Weight: 55.7 kg (122 lb 12.7  "oz)   Height: 1.651 m (5' 5\")      Gen: Well appearing, in NAD  CV: RRR, no murmurs/rubs/gallops  Pulm: Slight wheezing in bilateral lower lung fields, clear to auscultation in upper lung fields bilaterally, no increased work of breathing, no rhonchi/crackles  Abd: non-tender, non-distended  Pelvis: Deferred  Extremities: non-tender, no erythema; no edema    Labs:       Lab Results   Component Value Date     01/05/2018    Lab Results   Component Value Date    CHLORIDE 107 01/05/2018    Lab Results   Component Value Date    BUN 8 01/05/2018      Lab Results   Component Value Date    POTASSIUM 3.6 01/05/2018    Lab Results   Component Value Date    CO2 22 01/05/2018    Lab Results   Component Value Date    CR 0.53 01/05/2018        Lab Results   Component Value Date    WBC 7.2 01/05/2018    HGB 13.6 01/05/2018    HCT 40.4 01/05/2018    MCV 99 01/05/2018     01/05/2018         A&P:  Crsitin Ibarra is a 67 year old presenting for planned Robotic Total Laparoscopic Hysterectomy, Bilateral Salpingectomy. Risk, benefits, alternatives were discussed, and consent was signed. Plan to proceed with scheduled procedure.      Masha Mayo, PGY2  1/12/2018 6:13 AM             "

## 2018-01-12 NOTE — OR NURSING
Dr Knight responded to page to team who was in OR. Dr Knight stated she would come to evaluate the patient either in PACU or Phase II shortly, but no intervention ordered at this time. Gauze placed over saturated dressing. Pt progressing to Phase II, pain much improved and denies nausea. VSS.

## 2018-01-12 NOTE — PROGRESS NOTES
Patient states pain is doing much better, voided 300ml's of urine. Okay to discharge home per anesthesia and surgery teams.

## 2018-01-12 NOTE — OP NOTE
GYNECOLOGIC ONCOLOGY OPERATION NOTE     PATIENT: Cristin Ibarra  MRN: [id number]  DATE OF SURGERY: January 12, 2018     PREOPERATIVE DIAGNOSES:   1. Uterine carcinosarcoma     POSTOPERATIVE DIAGNOSES:   1. Uterine carcinosarcoma     PROCEDURES:   1. Da Bianca assisted total laparoscopic hysterectomy  2. Bilateral salpingo-oophorectomy  3. Bilateral pelvic and paraaortic lymphadenectomy  4. Omental biopsy  5. Cystoscopy     SURGEON: Lusi Hayes MD      ASSISTANTS:   1. Ayana Bello MD, 2nd year fellow  2. Masha Mayo MD, 2nd year resident     ANESTHESIA: General endotracheal.      ESTIMATED BLOOD LOSS: 50 ml      IV FLUIDS: 1500 ml crystalloid     URINE OUTPUT: 150 ml     DRAINS: none      FINDINGS: On exam under anesthesia, tight vaginal introitus with normal appearing cervix. Enlarged, mobile uterus. On laparoscopy, the upper abdomen was normal appearing with smooth diaphragm and liver surfaces. No enlarged lymph nodes appreciated. In the pelvis, enlarged uterus, anteverted with multiple subserosal fibroids with largest measuring ~ 4x4 cm on the right lower uterine segment. Normal appearing bilateral tubes and ovaries. Uterus was unable to be delivered through the vagina, so a mini-laparotomy was made to remove the uterus, which had been placed within endobag and bivalved within the bag to remove without spillage. On cystoscopy, normal appearing bladder dome and trigone with bilateral ureteral jets appreciated. On vaginal exam following procedure, the episiotomy had been repaired and premarin cream applied at introitus.            SPECIMENS: Uterus, cervix, bilateral tube and ovary, bilateral pelvic and paraaortic lymph nodes, pelvic washings, omental biopsy     COMPLICATIONS: None.      CONDITION: Stable to PACU.      INDICATIONS: Ms. Ibarra is a 67 year old with recent diagnosis of uterine carcinosarcoma on endometrial biopsy. [facility] and consented for the above stated  procedure.     OPERATIVE PROCEDURE IN DETAIL: Consent was reviewed with the patient in the preoperative setting and confirmed. She received prophylactic antibiotics. The patient was transferred to the operating room and placed in dorsal supine position. General anesthetic was obtained in the usual manner without noted difficulties. The patient was then positioned onto Gage stirrups and an exam under anesthesia was performed with findings as described above. Keller catheter was then placed under sterile techniques and the patient was prepped and draped for the above-mentioned procedure.      Timeout was called at which point the patient's name, procedure and operative site was confirmed by the operative team. Initially, the instruments for the vaginal manipulator were positioned, a speculum was placed in the vagina. Due to a tight vaginal introitus, a small midline episiotomy was created. The anterior lip of the cervix was grasped and the uterus sounded to 10 cm, and cervix was serially dilated. The VCare vaginal manipulator was then inserted without difficulty or perforation. Attention was then turned to the upper abdomen. Initially, a stab incision was made at the level of the umbilicus and Veress needle inserted into the abdomen without difficulty. A pneumoperitoneum was established. We then made a 8 mm camera port site incision ~ 4 cm superior to the umbilicus. The port was introduced into the abdomen without difficulty. The camera was then inserted into the abdomen and inspection for of organs directly below the site of entry was performed without identifying any injury. Sites for the da Bianca laparoscopic ports were demarcated, total of 5. The right 2 lateral 8 mm da Bianca ports were inserted and on the left, a 12 mm and an 8 mm all under direct visualization without any vascular injury noted. The pelvis was inspected as well as the upper abdomen as noted above. At this point, the patient was placed into steep  Trendelenburg. Pelvic washings were obtained. Bowels were packed up into the upper abdomen with gentle traction. At this point, da Bianca was docked onto the ports, all appropriate instruments were inserted.      We then turned our attention to the paraaortic lymph node dissection. The right ureter was easily identified transperitoneally. The peritoneum overlying the common iliac, extending over the aorta to the level of the bifurcation of the inferior mesenteric artery was incised with monopolar cautery. Next, we performed right periaortic lymph node dissection extending from the bifurcation of the common iliac artery along the course of the aorta and lateral to the vena cava. The monopolar cautery was utilized for hemostasis as necessary. The entire corbin bundle was removed through the 12 mm assistant port site.     We then turned our attention to the pelvic lymph node dissection. The right round ligament was tented and transected with monopolar scissors. We then opened up the retroperitoneum on the right parallel to the infundibulopelvic ligament. The ureter was identified on the medial leaf of the peritoneum. The peritoneum overlying the right external iliac artery was incised. We then identified the superior vesicle artery and opened up the obturator space and identified the obturator nerve. The borders of the pelvic lymph node dissection included cephalad bifurcation of the common iliac artery, caudad to the circumflex iliac vein, medially the superior vesicle artery and lateral to genitofemoral nerve. Lymph nodes overlying the iliac vessels towards the external iliac artery and vein, were elevated and resected and additional lymph nodes within the obturator space were removed above the level of the nerve without injury to the nerve. Hemostasis was obtained. The lymph nodes were removed through the assistant port site. Attention was turned towards the left. First, the sigmoid colon was adherent to the  infundibulopelvic ligament on the left and was sharply taken down with scissors. Then, in a similar manner, the left round ligament was tented and transected with monopolar scissors. We then opened up the retroperitoneum on the left parallel to the infundibulopelvic ligament. The ureter was identified on the medial leaf of the peritoneum. The peritoneum overlying the left external iliac artery was incised. We then identified the superior vesicle artery and opened up the obturator space and identified the obturator nerve. The borders of the pelvic lymph node dissection included cephalad bifurcation of the common iliac artery, caudad to the circumflex iliac vein, medially the superior vesicle artery and lateral to genitofemoral nerve. Lymph nodes overlying the iliac vessels towards the external iliac artery and vein, were elevated and resected and additional lymph nodes within the obturator space were removed above the level of the nerve without injury to the nerve. Hemostasis was obtained. The lymph nodes were removed through the assistant port site.    We then turned our attention to the hysterectomy, which was initiated on the right side. The right ureter was identified and a window was created between the infundibulopelvic vessels and ureter on the medial leaf of the broad ligament. The infundibulopelvic vessels were then coagulated and transected with vessel sealer device. The anterior leaf of the broad ligament was then taken down and bladder flap created. The uterine vessels were then coagulated with vessel sealer device. Of note, there was a 4x4 cm lower uterine subserosal fibroid along the left. We then turned our attention to the left. In a similar manner, the ureter was identified and a window was created between the infundibulopelvic vessels and ureter on the medial leaf of the broad ligament. The infundibulopelvic vessels were then coagulated and transected with the vessel sealer. The anterior leaf of the  broad ligament was then taken down and connected with the bladder flap from the right. The uterine vessels were then taken with the vessel sealer at the level of the internal os and allowed to fall laterally. We then hugged the cervix and took straight bites with the vessel sealer in a serial manner to the level of the cervix. The bladder flap was further developed and pushed well below the cervical ring. At this point, we were able to palpate the line of the VCare cup at the level of the upper vagina in a circumferential manner. We incised along the upper vagina to resect the cervix, uterus and right tube and ovary. The specimen was attempted to be brought out through the vaginal opening, but was deemed too big. We decided that we would have to make a mini-laparotomy to remove the specimen. At this time, the vagina was then closed with running V-lock suture, initiated at the right cuff apex and moving to the left and then overlapping. Excellent reapproximation of the vagina mucosa was observed.     Of note, when amputating the cervix with the monopolar scissors, an epiploica of the sigmoid colon was momentarily burned. We looked at the cautery burn following closure of the vaginal cuff and determined that no stitch was required because it was not on the sigmoid serosa. The rest of the sigmoid was run and no abnormalities appreciated.     We then pulled the omentum down into the pelvis and took a small biopsy of the infracolic omentum and sent it to permanent pathology. Next, we performed cystoscopy using 30-degree angled scope. We noted normal bladder mucosa and pyridium-stained efflux from bilateral ureteral orifice.     The robot was undocked and patient taken out of Trendelenburg. Pneumoperitoneum was maintained while a low-transverse incision was made ~ 2 cm above the pubic bone. Dissection was carried out through the fascia and into the abdominal peritoneum. The gas was then turned off. The specimen was  grasped and removed after extending the incision and bivalving the specimen within the endobag. It was sent off for permanent pathology. The fascia was then closed with 0-vicryl suture and irrigated. We then closed skin with 4-0 Monocryl. We then closed the 12 mm fascia with 0-vicryl. We then closed the port sites with 4-0 Monocryl.     At this point, the vagina was inspected and no more lacerations were appreciated. We applied Premarin cream to the episiotomy site. Sponge, lap and needle counts were reported as correct x2 and instrument count was correct x1.      Dr Hayes was present and scrubbed throughout the entire procedure.    Ayana Bello MD  Gynecologic Oncology Fellow

## 2018-01-12 NOTE — ANESTHESIA POSTPROCEDURE EVALUATION
Patient: Cristin Ibarra    Procedure(s):  DaVinci Total Laparoscopic Hysterectomy, Bilateral Salpingo-Oophorectomy, Cystoscopy, Paraaortic and bilateral pelvic Lymph Node Dissection, Omental biopsy, episiotomy repair, pelvic washings, Anesthesia Block - Wound Class: II-Clean Contaminated    Diagnosis:Uterine Carcinosarcoma   Diagnosis Additional Information: No value filed.    Anesthesia Type:  General    Note:  Anesthesia Post Evaluation    Patient location during evaluation: Phase 2  Patient participation: Able to fully participate in evaluation  Level of consciousness: awake and alert  Pain management: adequate  Airway patency: patent  Cardiovascular status: blood pressure returned to baseline and hemodynamically stable  Respiratory status: room air  Hydration status: acceptable  PONV: none             Last vitals:  Vitals:    01/12/18 1600 01/12/18 1630 01/12/18 1700   BP: 114/74 115/56 121/73   Resp: 16 16 18   Temp:   36.6  C (97.9  F)   SpO2: 95% 95% 95%         Electronically Signed By: Otilio Garsia MD  January 12, 2018  5:31 PM

## 2018-01-12 NOTE — ANESTHESIA PROCEDURE NOTES
Peripheral Nerve Block Procedure Note    Staff:     Anesthesiologist:  SHMUEL CALL    Resident/CRNA:  TEJA ALONZO    Block performed by resident/CRNA in the presence of a teaching physician    Location: OR AFTER induction  Procedure Start/Stop TImes:      1/12/2018 8:00 AM     1/12/2018 8:10 AM    patient identified, IV checked, site marked, risks and benefits discussed, informed consent, monitors and equipment checked, pre-op evaluation, at physician/surgeon's request and post-op pain management      Correct Patient: Yes      Correct Position: Yes      Correct Site: Yes      Correct Procedure: Yes      Correct Laterality:  N/A    Site Marked:  N/A  Procedure details:     Procedure:  TAP    ASA:  3    Diagnosis:  Post op analgesia    Laterality:  Bilateral    Position:  Supine    Sterile Prep: chloraprep, mask and sterile gloves      Local skin infiltration:  None    Needle:  Short bevel    Needle gauge:  21    Needle length (mm):  110    Ultrasound: Yes      Ultrasound used to identify targeted nerve, plexus, or vascular structure and placed a needle adjacent to it      Permanent Image entered into patiient's record      Abnormal pain on injection: No      Blood Aspirated: No      Paresthesias:  No    Bleeding at site: No      Infusion Method:  Single Shot    Complications:  None  Assessment/Narrative:     Injection made incrementally with aspirations every (mL):  5

## 2018-01-12 NOTE — PROGRESS NOTES
Gyn Oncology Postop Check     S: Ally is doing well. Said she had some sharp pain on her right side before, but that has gone down to more of a gnawing pain. Denies nausea or vomiting, has tolerated some clear liquids. Denies lightheadedness or dizziness.  No SOB, CP.      O:  Vitals:    01/12/18 1445 01/12/18 1450 01/12/18 1500 01/12/18 1524   BP: 139/69  142/89 139/84   Resp: 16  16 15   Temp:  98.2  F (36.8  C)  97.4  F (36.3  C)   TempSrc:  Oral  Axillary   SpO2: 98%  98% 98%   Weight:       Height:            Gen: comfortable, no acute distress  CV: RRR, no murmurs  Lungs: CTAB, appropriate work of breathing  Abd: soft, non tender to palpation, nondistended.   Laparoscopic incisions covered with primipore dressings, minimal shadowing on bandages. Mini laparotomy incision intact, covered with steri strips that are saturated with serosanguinous drainage. Dried off, replaced dressing with gauze and tegaderm. No erythema or induration, no purulent drainage  Ext: warm and well perfused, no edema.    Hemoglobin   Date Value Ref Range Status   01/05/2018 13.6 11.7 - 15.7 g/dL Final       A/P:Cristin Ibarra is a 67 year old female POD#0 s/p robotic-assisted total laparoscopic hysterectomy, BSO, pelvic and paraaortic lymph node dissection, omental biopsy, cystoscopy, episiotomy repair who is doing well postoperatively.   - Patient is meeting goals for discharge except she has not been out of bed to void yet. If she can do that without problem, then stable for discharge home.  - Reviewed precautions for discharge as well as when to call or return to the ED.  - We discussed postop/recovery expectations, lifting restrictions, and pelvic rest.  - Patient has follow up appt scheduled on 2/2 but that is Superbowl weekend so she says she will likely change it to a better time.     Frances Knight MD  OB/GYN Resident PGY4  Pager [phone]  01/12/18

## 2018-01-12 NOTE — ANESTHESIA PREPROCEDURE EVALUATION
Anesthesia Evaluation     .             ROS/MED HX    ENT/Pulmonary: Comment: Current smoker     (+)tobacco use, Current use 1/2ppd x 50yrs  packs/day  , . .    Neurologic:       Cardiovascular:     (+) Dyslipidemia, ----. : . . . :. .       METS/Exercise Tolerance:     Hematologic:         Musculoskeletal:         GI/Hepatic: Comment: Irritable bowel syndrome   Tubular adenoma polyps        Renal/Genitourinary: Comment: Uterine cancer         Endo:         Psychiatric:         Infectious Disease:         Malignancy:   (+) Malignancy History of Breast and Other  Breast CA Remission status post Surgery and Chemo. Other CA Active status post Uterine cancer  R breast cancer s/p chemoradiation and radical mastectomy         Other:                     Physical Exam  Normal systems: pulmonary and dental    Airway   Mallampati: II  Neck ROM: full    Dental     Cardiovascular   Rhythm and rate: regular and normal      Pulmonary                     Anesthesia Plan      History & Physical Review  History and physical reviewed and following examination; no interval change.    ASA Status:  3 .        Plan for General with Intravenous induction. Maintenance will be Inhalation.    PONV prophylaxis:  Ondansetron (or other 5HT-3)       Postoperative Care  Postoperative pain management:  IV analgesics, Multi-modal analgesia, Peripheral nerve block (Single Shot) and Oral pain medications.      Consents  Anesthetic plan, risks, benefits and alternatives discussed with:  Patient..                          .

## 2018-01-12 NOTE — DISCHARGE INSTRUCTIONS
[facility]  Same-Day Surgery   Adult Discharge Orders & Instructions     For 24 hours after surgery    1. Get plenty of rest.  A responsible adult must stay with you for at least 24 hours after you leave the hospital.   2. Do not drive or use heavy equipment.  If you have weakness or tingling, don't drive or use heavy equipment until this feeling goes away.  3. Do not drink alcohol.  4. Avoid strenuous or risky activities.  Ask for help when climbing stairs.   5. You may feel lightheaded.  IF so, sit for a few minutes before standing.  Have someone help you get up.   6. If you have nausea (feel sick to your stomach): Drink only clear liquids such as apple juice, ginger ale, broth or 7-Up.  Rest may also help.  Be sure to drink enough fluids.  Move to a regular diet as you feel able.  7. You may have a slight fever. Call the doctor if your fever is over 100 F (37.7 C) (taken under the tongue) or lasts longer than 24 hours.  8. You may have a dry mouth, a sore throat, muscle aches or trouble sleeping.  These should go away after 24 hours.  9. Do not make important or legal decisions.   Call your doctor for any of the followin.  Signs of infection (fever, growing tenderness at the surgery site, a large amount of drainage or bleeding, severe pain, foul-smelling drainage, redness, swelling).    2. It has been over 8 to 10 hours since surgery and you are still not able to urinate (pass water).    3.  Headache for over 24 hours.      To contact a doctor, call Dr Joyner's office at [phone]   or:        [phone] and ask for the resident on call for   OBGYN (answered 24 hours a day)      Emergency Department:    [facility]: [phone]       (TTY for hearing impaired: [phone])

## 2018-01-12 NOTE — IP AVS SNAPSHOT
MRN:[id number]                      After Visit Summary   1/12/2018    Cristin Ibarra    MRN: [id number]           Thank you!     Thank you for choosing [facility] for your care. Our goal is always to provide you with excellent care. Hearing back from our patients is one way we can continue to improve our services. Please take a few minutes to complete the written survey that you may receive in the mail after you visit with us. Thank you!        Patient Information     Date Of Birth          1950        About your hospital stay     You were admitted on:  January 12, 2018 You last received care in the:  Post Anesthesia Care Unit [facility]    You were discharged on:  January 12, 2018       Who to Call     For medical emergencies, please call 911.  For non-urgent questions about your medical care, please call your primary care provider or clinic, [phone]  For questions related to your surgery, please call your surgery clinic        Attending Provider     Provider Specialty    Luis Hayes MD Oncology       Primary Care Provider Office Phone # Fax #    Cristiana Huber [phone]      After Care Instructions     Discharge Instructions       Resume pre procedure diet            Discharge Instructions       Pelvic Rest. No tampons, douching or intercourse for  6  weeks.            Discharge Instructions       Patient may return to work POD  7            Discharge Instructions       Patient may drive beginning POD  7            Discharge Instructions       Patient to arrange follow up appointment in 1-2  weeks            Dressing       Keep dressing clean and dry, change as instructed by Provider or RN            Ice to affected area       PRN as tolerated            No alcohol       NO ALCOHOL for 24 hours post procedure            No driving or operating machinery       No driving or operating machinery until day after procedure            No lifting       No lifting over  10 pounds and no strenuous physical activity.  For 6 weeks            Shower        Shower on Post-op day  1.   DO NOT take a bath                  Further instructions from your care team       [facility], [place]  Same-Day Surgery   Adult Discharge Orders & Instructions     For 24 hours after surgery    1. Get plenty of rest.  A responsible adult must stay with you for at least 24 hours after you leave the hospital.   2. Do not drive or use heavy equipment.  If you have weakness or tingling, don't drive or use heavy equipment until this feeling goes away.  3. Do not drink alcohol.  4. Avoid strenuous or risky activities.  Ask for help when climbing stairs.   5. You may feel lightheaded.  IF so, sit for a few minutes before standing.  Have someone help you get up.   6. If you have nausea (feel sick to your stomach): Drink only clear liquids such as apple juice, ginger ale, broth or 7-Up.  Rest may also help.  Be sure to drink enough fluids.  Move to a regular diet as you feel able.  7. You may have a slight fever. Call the doctor if your fever is over 100 F (37.7 C) (taken under the tongue) or lasts longer than 24 hours.  8. You may have a dry mouth, a sore throat, muscle aches or trouble sleeping.  These should go away after 24 hours.  9. Do not make important or legal decisions.   Call your doctor for any of the followin.  Signs of infection (fever, growing tenderness at the surgery site, a large amount of drainage or bleeding, severe pain, foul-smelling drainage, redness, swelling).    2. It has been over 8 to 10 hours since surgery and you are still not able to urinate (pass water).    3.  Headache for over 24 hours.      To contact a doctor, call Dr Joyner's office at [phone]   or:        [phone] and ask for the resident on call for   OBGYN (answered 24 hours a day)      Emergency Department:    [facility]: [phone]       (TTY for hearing impaired:  "[phone])              Additional Information     If you use hormonal birth control (such as the pill, patch, ring or implants): You'll need a second form of birth control for 7 days (condoms, a diaphragm or contraceptive foam). While in the hospital, you received a medicine called Bridion. Your normal birth control will not work as well for a week after taking this medicine.          Pending Results     Date and Time Order Name Status Description    1/12/2018 0945 Surgical pathology exam In process     1/12/2018 0928 Cytology non gyn In process     1/12/2018 0527 EKG 12-lead, tracing only Preliminary             Admission Information     Date & Time Provider Department Dept. Phone    1/12/2018 Luis Hayes MD [facility] [phone]      Your Vitals Were     Blood Pressure Temperature Respirations Height Weight Pulse Oximetry    152/81 97.2  F (36.2  C) (Oral) 16 1.651 m (5' 5\") 55.7 kg (122 lb 12.7 oz) 100%    BMI (Body Mass Index)                   20.43 kg/m2           [facility] Information     [employer] gives you secure access to your electronic health record. If you see a primary care provider, you can also send messages to your care team and make appointments. If you have questions, please call your primary care clinic.  If you do not have a primary care provider, please call [phone] and they will assist you.        Care EveryWhere ID     This is your Care EveryWhere ID. This could be used by other organizations to access your [facility] medical records  [id number]        Equal Access to Services     MICHAEL SMALLWOOD : Hadii aad ku hadasho Socamposali, waaxda luqadaha, qaybta kaalmada adeegyada, vicki courtney . So [facility] [phone].    ATENCIÓN: Si habla español, tiene a hayes disposición servicios gratuitos de asistencia lingüística. Llame al [phone].    We comply with applicable federal civil rights laws and [place] laws. We do not discriminate on " the basis of race, color, national origin, age, disability, sex, sexual orientation, or gender identity.               Review of your medicines      START taking        Dose / Directions    acetaminophen 325 MG tablet   Commonly known as:  TYLENOL   Used for:  S/P complete hysterectomy        Dose:  650 mg   Take 2 tablets (650 mg) by mouth every 4 hours as needed for other (mild pain)   Quantity:  30 tablet   Refills:  0       ibuprofen 600 MG tablet   Commonly known as:  ADVIL/MOTRIN   Used for:  S/P complete hysterectomy        Dose:  600 mg   Take 1 tablet (600 mg) by mouth every 6 hours as needed for pain (mild)   Quantity:  30 tablet   Refills:  0       oxyCODONE IR 5 MG tablet   Commonly known as:  ROXICODONE   Used for:  S/P complete hysterectomy        Dose:  5-10 mg   Take 1-2 tablets (5-10 mg) by mouth every 3 hours as needed for pain or other (Moderate to Severe)   Quantity:  30 tablet   Refills:  0       senna-docusate 8.6-50 MG per tablet   Commonly known as:  SENOKOT-S;PERICOLACE   Used for:  S/P complete hysterectomy        Dose:  1-2 tablet   Take 1-2 tablets by mouth 2 times daily Take while on oral narcotics to prevent or treat constipation.   Quantity:  30 tablet   Refills:  0         CONTINUE these medicines which have NOT CHANGED        Dose / Directions    CALCIUM/VITAMIN D PO        Dose:  1 tablet   Take 1 tablet by mouth daily   Refills:  0       MULTIVITAMIN PO        Dose:  1 tablet   Take 1 tablet by mouth daily   Refills:  0       RA VITAMIN C/MAJO HIPS 1000 MG Tabs tablet   Generic drug:  ascorbic acid        Refills:  0            Where to get your medicines      These medications were sent to [facility] [place], [place] - [address]  [address] [address]     Phone:  [phone]     acetaminophen 325 MG tablet    ibuprofen 600 MG tablet    senna-docusate 8.6-50 MG per tablet         Some of these will need a paper prescription and  others can be bought over the counter. Ask your nurse if you have questions.     Bring a paper prescription for each of these medications     oxyCODONE IR 5 MG tablet                Protect others around you: Learn how to safely use, store and throw away your medicines at www.disposemymeds.org.             Medication List: This is a list of all your medications and when to take them. Check marks below indicate your daily home schedule. Keep this list as a reference.      Medications           Morning Afternoon Evening Bedtime As Needed    acetaminophen 325 MG tablet   Commonly known as:  TYLENOL   Take 2 tablets (650 mg) by mouth every 4 hours as needed for other (mild pain)   Last time this was given:  975 mg on 1/12/2018  6:13 AM                                CALCIUM/VITAMIN D PO   Take 1 tablet by mouth daily                                ibuprofen 600 MG tablet   Commonly known as:  ADVIL/MOTRIN   Take 1 tablet (600 mg) by mouth every 6 hours as needed for pain (mild)                                MULTIVITAMIN PO   Take 1 tablet by mouth daily                                oxyCODONE IR 5 MG tablet   Commonly known as:  ROXICODONE   Take 1-2 tablets (5-10 mg) by mouth every 3 hours as needed for pain or other (Moderate to Severe)                                RA VITAMIN C/MAJO HIPS 1000 MG Tabs tablet   Generic drug:  ascorbic acid                                senna-docusate 8.6-50 MG per tablet   Commonly known as:  SENOKOT-S;PERICOLACE   Take 1-2 tablets by mouth 2 times daily Take while on oral narcotics to prevent or treat constipation.

## 2018-01-12 NOTE — OR NURSING
"Dr Bello was text paged to inform about significant drainage on lower abdominal incision. At 1400 the area was about the size of a quarter, covered half of bandage by 1430, dressing now saturated. Scant flow on peripad. Pain \"much better\" per patient. VSS  "

## 2018-01-12 NOTE — BRIEF OP NOTE
[facility], [place]    Brief Operative Note    Pre-operative diagnosis: Uterine Carcinosarcoma     Post-operative diagnosis * No post-op diagnosis entered *    Procedure: Procedure(s):  DaVinci Total Laparoscopic Hysterectomy, Bilateral Salpingo-Oophorectomy, Cystoscopy, Paraaortic and bilateral pelvic Lymph Node Dissection, Omental biopsy, episiotomy repair, pelvic washings, Anesthesia Block - Wound Class: II-Clean Contaminated   - Wound Class: II-Clean Contaminated   - Wound Class: II-Clean Contaminated    Surgeon: Surgeon(s) and Role:  Panel 1:     * Luis Hayes MD - Primary     * Ayana Bello MD - Resident - Assisting    Panel 2:     * Luis Hayes MD - Primary    Anesthesia: Combined General with Block     Estimated blood loss: 50 ml  IVFs: 1500 ml crystalloid  UOP: 150 ml    Drains:  none    Specimens:   ID Type Source Tests Collected by Time Destination   1 : Pelvic Washings Washings Pelvis CYTOLOGY NON GYN Luis Hayes MD 1/12/2018  8:50 AM    A : PeriAortic Lymph Nodes Tissue Lymph Node, Para-Aortic (6) SURGICAL PATHOLOGY EXAM Luis Hayes MD 1/12/2018  9:44 AM    B : Right pelvic lymph nodes Tissue Lymph Node, Right Pelvic SURGICAL PATHOLOGY EXAM Luis Hayes MD 1/12/2018 10:01 AM    C : Left pelvic lymph nodes Tissue Lymph Node, Left Pelvic SURGICAL PATHOLOGY EXAM Luis Hayes MD 1/12/2018 10:38 AM    D : Uterus, cervix, bilateral fallopian tubes and ovaries Tissue Uterus with Bilateral Ovaries and Fallopian Tubes SURGICAL PATHOLOGY EXAM Luis Hayes MD 1/12/2018 12:03 PM    E : omental biopsy Tissue Omentum SURGICAL PATHOLOGY EXAM Luis Hayes MD 1/12/2018 11:48 AM      Findings: On exam under anesthesia, tight vaginal introitus with normal appearing cervix. Enlarged, mobile uterus. On laparoscopy, the upper abdomen was normal appearing with smooth diaphragm and liver surfaces. No enlarged lymph nodes appreciated. In the pelvis, enlarged uterus,  anteverted with multiple subserosal fibroids with largest measuring ~ 4x4 cm on the right lower uterine segment. Normal appearing bilateral tubes and ovaries. Uterus was unable to be delivered through the vagina, so a mini-laparotomy was made to remove the uterus, which had been placed within endobag and bivalved within the bag to remove without spillage. On cystoscopy, normal appearing bladder dome and trigone with bilateral ureteral jets appreciated. On vaginal exam following procedure, the episiotomy had been repaired and premarin cream applied at introitus.      Complications: none    Implants: none

## 2018-01-12 NOTE — PROGRESS NOTES
Dr. Garsia at bedside to evaluate patient's complaint of severe sharp pain to right should after moving from cart to recliner. Toradol ordered and administered.

## 2018-01-13 LAB — INTERPRETATION ECG - MUSE: NORMAL

## 2018-01-15 LAB — COPATH REPORT: NORMAL

## 2018-01-16 ENCOUNTER — TELEPHONE (OUTPATIENT)
Dept: ONCOLOGY | Facility: CLINIC | Age: 68
End: 2018-01-16

## 2018-01-22 LAB — COPATH REPORT: NORMAL

## 2018-01-29 ENCOUNTER — TELEPHONE (OUTPATIENT)
Dept: ONCOLOGY | Facility: CLINIC | Age: 68
End: 2018-01-29

## 2018-01-29 DIAGNOSIS — C55 UTERINE CARCINOSARCOMA (H): Primary | ICD-10-CM

## 2018-01-30 ENCOUNTER — TELEPHONE (OUTPATIENT)
Dept: ONCOLOGY | Facility: CLINIC | Age: 68
End: 2018-01-30

## 2018-01-30 NOTE — TELEPHONE ENCOUNTER
Collected: 1/12/2018     SPECIMEN(S):   A: Lymph node, josiah-aortic   B: Lymph nodes, right pelvic   C: Lymph nodes, left pelvic   D: Uterus, cervix, bilateral ovaries and fallopian tubes   E: Omentum, biopsy     FINAL DIAGNOSIS:   A. LYMPH NODES, PARA-AORTIC, DISSECTION:   - Four benign lymph nodes (0/4)     B. LYMPH NODES, RIGHT PELVIC, DISSECTION:   - Four benign lymph nodes (0/4)     C. LYMPH NODES, LEFT PELVIC, DISSECTION:   - Four benign lymph nodes (0/4)     D. 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   - Superficial myometrial invasion   - No lymphovascular invasion identified   - Uterine serosa uninvolved by malignant tumor   - Myometrial leiomyomata (largest: 3.5 cm)   - Cervix uninvolved by malignant tumor   - Bilateral ovaries and fallopian tubes uninvolved by malignant tumor   - Left ovarian serous cystadenoma (size 2.2 cm) and cortical inclusion   cysts   - Right ovarian cortical inclusion cysts   - Proximal portion of right fallopian tube with mucosal endometriosis and   paratubal foreign body giant cell   reaction   - Unremarkable left fallopian tube   - Endometrial tumor shows a normal pattern of mismatch repair protein   expression by immunohistochemistry   - See comment     E. OMENTUM, BIOPSY:   - Benign adipose tissue       Pt calling still looking for path results  MD did not call her on Thursday or Friday  Message was given to MD on Thursday  I explained I sent another message was sent to MD   I apologized that MD had not gotten to her yet  Explained to pt I can see the results but do not know what the plan is for treatment.  Pt requested that I give her results and try to follow up with MD for him to call pt back    Reviewed above path results explained to pt and spouse as much as I could as to what this means.   Explained that exact follow up plan is not known and that some type of treatment is possible    Pt and spouse reports understanding    MD texted with information

## 2018-02-02 ENCOUNTER — CARE COORDINATION (OUTPATIENT)
Dept: ONCOLOGY | Facility: CLINIC | Age: 68
End: 2018-02-02

## 2018-02-09 ENCOUNTER — TELEPHONE (OUTPATIENT)
Dept: ONCOLOGY | Facility: CLINIC | Age: 68
End: 2018-02-09

## 2018-02-09 NOTE — TELEPHONE ENCOUNTER
Oncology Distress Screening Follow-up  Clinical Social Work  [facility]    Identified Concern and Score From Distress Screening: How concerned are you about feeling anxious or very scared? 9    Date of Distress Screenin18     Data:  Pt is a 67 yr old female with a diagnosis of uterine carcinosarcoma    Intervention: Spoke with pt to follow up re: oncology distress screening.  Pt states no concerns at this time, states this isn't first cancer diagnosis and familiar with what to expect.  Pt states aware of upcoming appt with Dr. Hayes and no questions for SW at this time.      Education Provided: n/a    Follow-up Required: SW available as necessary.       PINKY Thomas, MSW   - [facility]  Phone: [phone]  Pager: [phone]  2018

## 2018-02-16 ENCOUNTER — ONCOLOGY VISIT (OUTPATIENT)
Dept: ONCOLOGY | Facility: CLINIC | Age: 68
End: 2018-02-16
Attending: OBSTETRICS & GYNECOLOGY
Payer: COMMERCIAL

## 2018-02-16 VITALS
BODY MASS INDEX: 20.88 KG/M2 | DIASTOLIC BLOOD PRESSURE: 76 MMHG | SYSTOLIC BLOOD PRESSURE: 119 MMHG | TEMPERATURE: 98.8 F | OXYGEN SATURATION: 97 % | HEART RATE: 73 BPM | HEIGHT: 65 IN | WEIGHT: 125.3 LBS | RESPIRATION RATE: 18 BRPM

## 2018-02-16 DIAGNOSIS — C55 UTERINE CARCINOSARCOMA (H): Primary | ICD-10-CM

## 2018-02-16 PROCEDURE — 99214 OFFICE O/P EST MOD 30 MIN: CPT | Mod: ZP | Performed by: OBSTETRICS & GYNECOLOGY

## 2018-02-16 PROCEDURE — G0463 HOSPITAL OUTPT CLINIC VISIT: HCPCS | Mod: ZF

## 2018-02-16 ASSESSMENT — PAIN SCALES - GENERAL: PAINLEVEL: NO PAIN (0)

## 2018-02-16 NOTE — LETTER
2018       RE: Cristin Ibarra  [address] [place] [address]     Dear Colleague,    Thank you for referring your patient, Cristin Ibarra, to the [facility]. Please see a copy of my visit note below.                Follow Up Notes on Referred Patient    Date: 2018       Dr. Cristiana Huber  [facility]  [address] [place] [address]       RE: Cristin Ibarra  : 1950  JEMAL: 2018    Dear Dr. Cristiana Huber:    Cristin Ibarra is a 67 year old woman s/p DaVinci assisted Total laparoscopic hysterectomy, bilateral sapingo-oophorectomy, bilateral pelvic and para-aortic lymph node dissection, omental biopsy and cystoscopy on 18 for Stage IA endometrial carcinosarcoma (MMMT).     Initially had appointment scheduled for , but rescheduled.  She presents today for post-operative follow-up with her .     Overall, she is doing well.  Does state that she has some minor fatigue, but otherwise has been doing well.  Did have some minor vaginal spotting this past Tues and Wed following mopping the floors on Tuesday am. Filled 1/2 small day peripad on Tues afternoon with minimal spotting on Wed. This past week, she has also noticed on several occasions some intermittent abdominal cramping that moves across her lower abdomen.  Bowel movement every other day.  Using stool softener.  Denies constipation or diarrhea.     Denies chest pain, shortness of breath, pain in lower extremities, vaginal bleeding, fevers or chills, nausea or vomiting.     Discussed findings from pathology today.     Cancer History:  17 - presentation for postmenopausal bleeding with endometrial biopsy revealing carcinosarcoma  18: DaVinci assisted Total laparoscopic hysterectomy, bilateral sapingo-oophorectomy, bilateral pelvic and para-aortic lymph node dissection, omental biopsy and cystoscopy for Stage IA endometrial carcinosarcoma (MMMT)    Review of  Systems:  Systemic           no weight changes; no fever; no chills; no night sweats; no appetite changes, some overall weakness  Skin           no rashes, or lesions  Eye           no irritation; no changes in vision  Clemencia-Laryngeal           no dysphagia; no hoarseness   Pulmonary    no cough; no shortness of breath  Cardiovascular    no chest pain; no palpitations  Gastrointestinal    no diarrhea; no constipation; intermittent + abdominal pain - see HPI; no changes in bowel  habits; no blood in stool  Genitourinary   no urinary frequency; no urinary urgency; no dysuria; no pain; no abnormal vaginal discharge; + vaginal bleeding - see HPI  Breast   no breast discharge; no breast changes; no breast pain  Musculoskeletal    no myalgias; no arthralgias; no back pain  Psychiatric           no depressed mood; no anxiety    Hematologic           no tender lymph nodes; no noticeable swellings or lumps   Endocrine    no hot flashes; no heat/cold intolerance         Neurological   no tremor; no numbness and tingling; no headaches; no difficulty  sleeping      Past Medical History:    Past Medical History:   Diagnosis Date     Hyperlipidemia      Irritable bowel syndrome      Malignant neoplasm of female breast (H) 2007    Invasive ductal carcinoma, s/p chemo, radical mastectomy          Past Surgical History:    Past Surgical History:   Procedure Laterality Date     DAVINCI HYSTERECTOMY TOTAL, BILATERAL SALPINGO-OOPHORECTOMY, COMBINED N/A 1/12/2018    Procedure: COMBINED DAVINCI HYSTERECTOMY TOTAL, SALPINGO-OOPHORECTOMY;  DaVinci Total Laparoscopic Hysterectomy, Bilateral Salpingo-Oophorectomy, Cystoscopy, Paraaortic and bilateral pelvic Lymph Node Dissection, Omental biopsy, episiotomy repair, pelvic washings, Anesthesia Block;  Surgeon: Luis Hayes MD;  Location: [facility] OR     FRACTURE SURGERY       LAPAROSCOPIC HYSTERECTOMY TOTAL, BILATERAL SALPINGO-OOPHORECTOMY, COMBINED  01/12/2018    Robotic TLH, BSO, PPALND, Pelvic  washings, minilaparotomy, 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     MASTECTOMY MODIFIED RADICAL  2007     TUBAL LIGATION           Health Maintenance Due   Topic Date Due     TETANUS IMMUNIZATION (SYSTEM ASSIGNED)  10/20/1970     HEPATITIS C SCREENING  10/20/1970     LIPID SCREEN Q5 YR FEMALE (SYSTEM ASSIGNED)  10/20/1997     MAMMO SCREEN Q2 YR (SYSTEM ASSIGNED)  10/20/2002     COLON CANCER SCREEN (SYSTEM ASSIGNED)  10/20/2002     ADVANCE DIRECTIVE PLANNING Q5 YRS  10/20/2007     INFLUENZA VACCINE (SYSTEM ASSIGNED)  09/01/2017     FALL RISK ASSESSMENT  10/20/2017     DEXA SCAN SCREENING (SYSTEM ASSIGNED)  10/20/2017     PNEUMOCOCCAL (1 of 2 - PCV13) 10/20/2017       Current Medications:     Current Outpatient Prescriptions   Medication Sig Dispense Refill     Varenicline Tartrate (CHANTIX PO) Take 1 mg by mouth 2 times daily       senna-docusate (SENOKOT-S;PERICOLACE) 8.6-50 MG per tablet Take 1-2 tablets by mouth 2 times daily Take while on oral narcotics to prevent or treat constipation. (Patient taking differently: Take 1 tablet by mouth every 48 hours Take while on oral narcotics to prevent or treat constipation.) 30 tablet 0     ascorbic acid (RA VITAMIN C/MAJO HIPS) 1000 MG TABS tablet Take 1,000 mg by mouth daily        Multiple Vitamins-Minerals (MULTIVITAMIN PO) Take 1 tablet by mouth daily       Calcium Carb-Cholecalciferol (CALCIUM/VITAMIN D PO) Take 1 tablet by mouth daily       acetaminophen (TYLENOL) 325 MG tablet Take 2 tablets (650 mg) by mouth every 4 hours as needed for other (mild pain) (Patient not taking: Reported on 2/16/2018) 30 tablet 0     ibuprofen (ADVIL/MOTRIN) 600 MG tablet Take 1 tablet (600 mg) by mouth every 6 hours as needed for pain (mild) (Patient not taking: Reported on 2/16/2018) 30 tablet 0     oxyCODONE IR (ROXICODONE) 5 MG tablet Take 1-2 tablets (5-10 mg) by mouth every 3 hours as needed for pain or other (Moderate to Severe) (Patient not taking: Reported on 2/16/2018) 30  "tablet 0       Physical Exam:   /76  Pulse 73  Temp 98.8  F (37.1  C) (Oral)  Resp 18  Ht 1.651 m (5' 5\")  Wt 56.8 kg (125 lb 4.8 oz)  SpO2 97%  BMI 20.85 kg/m2  Body mass index is 20.85 kg/(m^2).    General Appearance: healthy and alert, no distress, sitting in chair    Cardiovascular: regular rate and rhythm, no gallops, rubs or murmurs   Respiratory: lungs clear, no rales, rhonchi or wheezes  Musculoskeletal: extremities non tender and without edema  Skin: no lesions or rashes   Neurological: normal gait, no gross defects   Psychiatric: appropriate mood and affect                           Gastrointestinal:       abdomen soft, non-tender, non-distended  Incisions: mini-lap with 5 port-site incisions, clean, dry and intact without erythema or induration or tenderness  Genitourinary: deferred    Assessment:  Cristin Ibarra is a 67 year old woman with a diagnosis of Stage IA carcinosarcoma (MMMT) s/p DaVinci assisted Total laparoscopic hysterectomy, bilateral sapingo-oophorectomy, bilateral pelvic and para-aortic lymph node dissection, omental biopsy and cystoscopy on 1/12/18.  She is doing well post-operatively today.     Given diagnosis of Stage IA, discussed further management options of chemotherapy vs. Surveillance.  Discussed that chemotherapy has had no proven benefit for long-term overall survival, but may have benefit for improving progression-free survival.  Reviewed side effects of chemotherapy regimen of Carboplatin/Paclitaxel. Patient previously underwent chemotherapy for breast cancer with hair loss and states that this was more traumatizing event for her than the chemotherapy.  If given the option, she would prefer to proceed with surveillance at this time. Will refer back to primary OB/GYN in [facility] for ongoing surveillance.     A total of 30 minutes was spent with the patient, 15 minutes of which were spent in counseling the patient and/or treatment planning.    Plan: "   - Follow-up with Primary OB/GYN in [place] for surveillance: pelvic/vaginal exam every 3 months for 2 years, followed by every 6mo or 3yrs.  If concerns on exam by OB/GYN for cancer recurrence, please refer back to Dr. Hayes (GYN/ONC).     Madalyn Deras MD MPH  OB/GYN, PGY3  Pager: [phone]  2/16/2018  8:49 AM    I evaluated and examined this patient and directed all aspects of her care as outlined in mine and the resident's note above    Luis Hayes Jr., M.D., M.S.  Professor, Gynecologic Oncology  Obstetrics, Gynecology and Women's Health   [employer] [employer] [facility] [employer]  Chief Medical Officer  [facility]  Patient Care Team  Cristiana Huber MD ([facility])  Deborah Maldonado MD ([facility] OB[facility]GY[facility])

## 2018-02-16 NOTE — MR AVS SNAPSHOT
"              After Visit Summary   2018    Cristin Ibarra    MRN: [id number]           Patient Information     Date Of Birth          1950        Visit Information        Provider Department      2018 9:00 AM Luis Hayes MD [facility]        Today's Diagnoses     Uterine carcinosarcoma (H)    -  1       Follow-ups after your visit        Who to contact     If you have questions or need follow up information about today's clinic visit or your schedule please contact [facility] directly at [phone].  Normal or non-critical lab and imaging results will be communicated to you by [employer], letter or phone within 4 business days after the clinic has received the results. If you do not hear from us within 7 days, please contact the clinic through [facility] or phone. If you have a critical or abnormal lab result, we will notify you by phone as soon as possible.  Submit refill requests through [employer] or call your pharmacy and they will forward the refill request to us. Please allow 3 business days for your refill to be completed.          Additional Information About Your Visit        MyChart Information     [employer] lets you send messages to your doctor, view your test results, renew your prescriptions, schedule appointments and more. To sign up, go to [email or web address] . Click on \"Log in\" on the left side of the screen, which will take you to the Welcome page. Then click on \"Sign up Now\" on the right side of the page.     You will be asked to enter the access code listed below, as well as some personal information. Please follow the directions to create your username and password.     Your access code is: [email or web address]  Expires: 5/3/2018  7:31 AM     Your access code will  in 90 days. If you need help or a new code, please call your [place] clinic or [phone].        Care EveryWhere ID     This is your Care EveryWhere ID. This could be used by " "other organizations to access your [facility] medical records  [id number]        Your Vitals Were     Pulse Temperature Respirations Height Pulse Oximetry BMI (Body Mass Index)    73 98.8  F (37.1  C) (Oral) 18 1.651 m (5' 5\") 97% 20.85 kg/m2       Blood Pressure from Last 3 Encounters:   02/16/18 119/76   01/12/18 121/73   01/05/18 126/75    Weight from Last 3 Encounters:   02/16/18 56.8 kg (125 lb 4.8 oz)   01/12/18 55.7 kg (122 lb 12.7 oz)   01/05/18 56.3 kg (124 lb 1.6 oz)              Today, you had the following     No orders found for display         Today's Medication Changes          These changes are accurate as of 2/16/18 11:59 PM.  If you have any questions, ask your nurse or doctor.               These medicines have changed or have updated prescriptions.        Dose/Directions    senna-docusate 8.6-50 MG per tablet   Commonly known as:  SENOKOT-S;PERICOLACE   This may have changed:    - how much to take  - when to take this  - additional instructions   Used for:  S/P complete hysterectomy        Dose:  1-2 tablet   Take 1-2 tablets by mouth 2 times daily Take while on oral narcotics to prevent or treat constipation.   Quantity:  30 tablet   Refills:  0                Primary Care Provider Office Phone # Fax #    Cristiana Huber [phone]       [place] [address]        Equal Access to Services     XIOMARA SMALLWOOD AH: Kristin Kinney, waaxda jimmy, qaybta kaalmarigo branchcelsa genaro courtney . So [facility] [phone].    ATENCIÓN: Si habla español, tiene a hayes disposición servicios gratuitos de asistencia lingüística. Llame al [phone].    We comply with applicable federal civil rights laws and [place] laws. We do not discriminate on the basis of race, color, national origin, age, disability, sex, sexual orientation, or gender identity.            Thank you!     Thank you for choosing [facility]  for your care. Our goal is always to provide you with excellent care. Hearing back from our patients is one way we can continue to improve our services. Please take a few minutes to complete the written survey that you may receive in the mail after your visit with us. Thank you!             Your Updated Medication List - Protect others around you: Learn how to safely use, store and throw away your medicines at www.disposemymeds.org.          This list is accurate as of 2/16/18 11:59 PM.  Always use your most recent med list.                   Brand Name Dispense Instructions for use Diagnosis    acetaminophen 325 MG tablet    TYLENOL    30 tablet    Take 2 tablets (650 mg) by mouth every 4 hours as needed for other (mild pain)    S/P complete hysterectomy       CALCIUM/VITAMIN D PO      Take 1 tablet by mouth daily        CHANTIX PO      Take 1 mg by mouth 2 times daily        ibuprofen 600 MG tablet    ADVIL/MOTRIN    30 tablet    Take 1 tablet (600 mg) by mouth every 6 hours as needed for pain (mild)    S/P complete hysterectomy       MULTIVITAMIN PO      Take 1 tablet by mouth daily        oxyCODONE IR 5 MG tablet    ROXICODONE    30 tablet    Take 1-2 tablets (5-10 mg) by mouth every 3 hours as needed for pain or other (Moderate to Severe)    S/P complete hysterectomy       RA VITAMIN C/MAJO HIPS 1000 MG Tabs tablet   Generic drug:  ascorbic acid      Take 1,000 mg by mouth daily        senna-docusate 8.6-50 MG per tablet    SENOKOT-S;PERICOLACE    30 tablet    Take 1-2 tablets by mouth 2 times daily Take while on oral narcotics to prevent or treat constipation.    S/P complete hysterectomy

## 2018-02-16 NOTE — NURSING NOTE
"Oncology Rooming Note    February 16, 2018 8:47 AM   Cristin Ibarra is a 67 year old female who presents for:    Chief Complaint   Patient presents with     Oncology Clinic Visit     Post Op Visit.     Initial Vitals: /76  Pulse 73  Temp 98.8  F (37.1  C) (Oral)  Resp 18  Ht 1.651 m (5' 5\")  Wt 56.8 kg (125 lb 4.8 oz)  SpO2 97%  BMI 20.85 kg/m2 Estimated body mass index is 20.85 kg/(m^2) as calculated from the following:    Height as of this encounter: 1.651 m (5' 5\").    Weight as of this encounter: 56.8 kg (125 lb 4.8 oz). Body surface area is 1.61 meters squared.  No Pain (0) Comment: Intermittent Cramping and Some Bleeding   No LMP recorded. Patient is postmenopausal.  Allergies reviewed: Yes  Medications reviewed: Yes    Medications: Medication refills not needed today.  Pharmacy name entered into EPIC: Data Unavailable    Clinical concerns: None Dr Hayes was NOT notified.    7 minutes for nursing intake (face to face time)     Sakina Gilbert LPN              "

## 2018-02-16 NOTE — PROGRESS NOTES
Follow Up Notes on Referred Patient    Date: 2018       Dr. Cristiana Huber  [facility]  [address] [place] [address]       RE: Cristin Ibarra  : 1950  JEMAL: 2018    Dear Dr. Cristiana Huber:    Cristin Ibarra is a 67 year old woman s/p DaVinci assisted Total laparoscopic hysterectomy, bilateral sapingo-oophorectomy, bilateral pelvic and para-aortic lymph node dissection, omental biopsy and cystoscopy on 18 for Stage IA endometrial carcinosarcoma (MMMT).     Initially had appointment scheduled for , but rescheduled.  She presents today for post-operative follow-up with her .     Overall, she is doing well.  Does state that she has some minor fatigue, but otherwise has been doing well.  Did have some minor vaginal spotting this past Tues and Wed following mopping the floors on Tuesday am. Filled 1/2 small day peripad on Tues afternoon with minimal spotting on Wed. This past week, she has also noticed on several occasions some intermittent abdominal cramping that moves across her lower abdomen.  Bowel movement every other day.  Using stool softener.  Denies constipation or diarrhea.     Denies chest pain, shortness of breath, pain in lower extremities, vaginal bleeding, fevers or chills, nausea or vomiting.     Discussed findings from pathology today.     Cancer History:  17 - presentation for postmenopausal bleeding with endometrial biopsy revealing carcinosarcoma  18: DaVinci assisted Total laparoscopic hysterectomy, bilateral sapingo-oophorectomy, bilateral pelvic and para-aortic lymph node dissection, omental biopsy and cystoscopy for Stage IA endometrial carcinosarcoma (MMMT)    Review of Systems:  Systemic           no weight changes; no fever; no chills; no night sweats; no appetite changes, some overall weakness  Skin           no rashes, or lesions  Eye           no irritation; no changes in vision  Clemencia-Laryngeal           no  dysphagia; no hoarseness   Pulmonary    no cough; no shortness of breath  Cardiovascular    no chest pain; no palpitations  Gastrointestinal    no diarrhea; no constipation; intermittent + abdominal pain - see HPI; no changes in bowel  habits; no blood in stool  Genitourinary   no urinary frequency; no urinary urgency; no dysuria; no pain; no abnormal vaginal discharge; + vaginal bleeding - see HPI  Breast   no breast discharge; no breast changes; no breast pain  Musculoskeletal    no myalgias; no arthralgias; no back pain  Psychiatric           no depressed mood; no anxiety    Hematologic           no tender lymph nodes; no noticeable swellings or lumps   Endocrine    no hot flashes; no heat/cold intolerance         Neurological   no tremor; no numbness and tingling; no headaches; no difficulty  sleeping      Past Medical History:    Past Medical History:   Diagnosis Date     Hyperlipidemia      Irritable bowel syndrome      Malignant neoplasm of female breast (H) 2007    Invasive ductal carcinoma, s/p chemo, radical mastectomy          Past Surgical History:    Past Surgical History:   Procedure Laterality Date     DAVINCI HYSTERECTOMY TOTAL, BILATERAL SALPINGO-OOPHORECTOMY, COMBINED N/A 1/12/2018    Procedure: COMBINED DAVINCI HYSTERECTOMY TOTAL, SALPINGO-OOPHORECTOMY;  DaVinci Total Laparoscopic Hysterectomy, Bilateral Salpingo-Oophorectomy, Cystoscopy, Paraaortic and bilateral pelvic Lymph Node Dissection, Omental biopsy, episiotomy repair, pelvic washings, Anesthesia Block;  Surgeon: Luis Hayes MD;  Location: [facility] OR     FRACTURE SURGERY       LAPAROSCOPIC HYSTERECTOMY TOTAL, BILATERAL SALPINGO-OOPHORECTOMY, COMBINED  01/12/2018    Robotic TLH, BSO, PPALND, Pelvic washings, minilaparotomy, 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     MASTECTOMY MODIFIED RADICAL  2007     TUBAL LIGATION           Health Maintenance Due   Topic Date Due     TETANUS IMMUNIZATION (SYSTEM ASSIGNED)  10/20/1970     HEPATITIS C  "SCREENING  10/20/1970     LIPID SCREEN Q5 YR FEMALE (SYSTEM ASSIGNED)  10/20/1997     MAMMO SCREEN Q2 YR (SYSTEM ASSIGNED)  10/20/2002     COLON CANCER SCREEN (SYSTEM ASSIGNED)  10/20/2002     ADVANCE DIRECTIVE PLANNING Q5 YRS  10/20/2007     INFLUENZA VACCINE (SYSTEM ASSIGNED)  09/01/2017     FALL RISK ASSESSMENT  10/20/2017     DEXA SCAN SCREENING (SYSTEM ASSIGNED)  10/20/2017     PNEUMOCOCCAL (1 of 2 - PCV13) 10/20/2017       Current Medications:     Current Outpatient Prescriptions   Medication Sig Dispense Refill     Varenicline Tartrate (CHANTIX PO) Take 1 mg by mouth 2 times daily       senna-docusate (SENOKOT-S;PERICOLACE) 8.6-50 MG per tablet Take 1-2 tablets by mouth 2 times daily Take while on oral narcotics to prevent or treat constipation. (Patient taking differently: Take 1 tablet by mouth every 48 hours Take while on oral narcotics to prevent or treat constipation.) 30 tablet 0     ascorbic acid (RA VITAMIN C/MAJO HIPS) 1000 MG TABS tablet Take 1,000 mg by mouth daily        Multiple Vitamins-Minerals (MULTIVITAMIN PO) Take 1 tablet by mouth daily       Calcium Carb-Cholecalciferol (CALCIUM/VITAMIN D PO) Take 1 tablet by mouth daily       acetaminophen (TYLENOL) 325 MG tablet Take 2 tablets (650 mg) by mouth every 4 hours as needed for other (mild pain) (Patient not taking: Reported on 2/16/2018) 30 tablet 0     ibuprofen (ADVIL/MOTRIN) 600 MG tablet Take 1 tablet (600 mg) by mouth every 6 hours as needed for pain (mild) (Patient not taking: Reported on 2/16/2018) 30 tablet 0     oxyCODONE IR (ROXICODONE) 5 MG tablet Take 1-2 tablets (5-10 mg) by mouth every 3 hours as needed for pain or other (Moderate to Severe) (Patient not taking: Reported on 2/16/2018) 30 tablet 0       Physical Exam:   /76  Pulse 73  Temp 98.8  F (37.1  C) (Oral)  Resp 18  Ht 1.651 m (5' 5\")  Wt 56.8 kg (125 lb 4.8 oz)  SpO2 97%  BMI 20.85 kg/m2  Body mass index is 20.85 kg/(m^2).    General Appearance: healthy and " alert, no distress, sitting in chair    Cardiovascular: regular rate and rhythm, no gallops, rubs or murmurs   Respiratory: lungs clear, no rales, rhonchi or wheezes  Musculoskeletal: extremities non tender and without edema  Skin: no lesions or rashes   Neurological: normal gait, no gross defects   Psychiatric: appropriate mood and affect                           Gastrointestinal:       abdomen soft, non-tender, non-distended  Incisions: mini-lap with 5 port-site incisions, clean, dry and intact without erythema or induration or tenderness  Genitourinary: deferred    Assessment:  Cristin Ibarra is a 67 year old woman with a diagnosis of Stage IA carcinosarcoma ([facility]) s/p DaVinci assisted Total laparoscopic hysterectomy, bilateral sapingo-oophorectomy, bilateral pelvic and para-aortic lymph node dissection, omental biopsy and cystoscopy on 1/12/18.  She is doing well post-operatively today.     Given diagnosis of Stage IA, discussed further management options of chemotherapy vs. Surveillance.  Discussed that chemotherapy has had no proven benefit for long-term overall survival, but may have benefit for improving progression-free survival.  Reviewed side effects of chemotherapy regimen of Carboplatin/Paclitaxel. Patient previously underwent chemotherapy for breast cancer with hair loss and states that this was more traumatizing event for her than the chemotherapy.  If given the option, she would prefer to proceed with surveillance at this time. Will refer back to primary OB/GYN in [facility] for ongoing surveillance.     A total of 30 minutes was spent with the patient, 15 minutes of which were spent in counseling the patient and/or treatment planning.    Plan:   - Follow-up with Primary OB/GYN in [place] for surveillance: pelvic/vaginal exam every 3 months for 2 years, followed by every 6mo or 3yrs.  If concerns on exam by OB/GYN for cancer recurrence, please refer back to Dr. Hayes (GYN/ONC).      Madalyn Deras MD MPH  OB/GYN, PGY3  Pager: [phone]  2/16/2018  8:49 AM    I evaluated and examined this patient and directed all aspects of her care as outlined in mine and the resident's note above    Luis Hayes Jr., M.D., M.S.  Professor, Gynecologic Oncology  Obstetrics, Gynecology and Women's Health   [employer] [facility] [facility] [employer]  Chief Medical Officer  [facility]  Patient Care Team  Cristiana Huber MD ([facility])  Deborah Maldonado MD ([facility] OB/GYN)

## 2018-02-21 NOTE — PROGRESS NOTES
Surgery is 1/12/18  Post op visit 2/2/18    Pt states all is good, having no problems  Stomach is bloated but eating/drinking well  Denies fever, leg redness/tenderness, urinary or bowel issues  Does note some leg swelling but improving  Incision looks healthy  Using pain meds ibuprofen, tylenol and oxycodone as needed    Pt will call if issues

## 2018-11-20 ENCOUNTER — TELEPHONE (OUTPATIENT)
Dept: ONCOLOGY | Facility: CLINIC | Age: 68
End: 2018-11-20

## 2018-11-20 NOTE — TELEPHONE ENCOUNTER
Tobacco Treatment Team at the [facility] attempted to reach Ms. Ibarra on 11/20/2018 regarding the tobacco cessation program to help Ms. Ibarra to quit smoking. We will attempt to reach Ms. Ibarra another time.

## 2018-12-03 NOTE — TELEPHONE ENCOUNTER
Tobacco Treatment Team at the [facility] attempted to reach Ms. Iabrra on 12/3/2018 regarding the tobacco cessation program to help Ms. Ibarra to quit smoking. We will attempt to reach Ms. Ibarra another time.

## 2018-12-05 NOTE — TELEPHONE ENCOUNTER
Tobacco Treatment Team at the [facility] attempted to reach Ms. Ibarra on 12/5/2018 regarding the tobacco cessation program to help Ms. Ibarra to quit smoking. We will attempt to reach Ms. Ibarra another time.

## 2019-05-02 NOTE — TELEPHONE ENCOUNTER
Tobacco Treatment Team at the [facility] attempted to reach Ms. Ibarra on 5/2/2019 regarding the tobacco cessation program to help Ms. Ibarra to quit smoking. We will attempt to reach Ms. Ibarra another time.

## 2021-08-19 NOTE — TELEPHONE ENCOUNTER
I called patient to discuss pathology results.  Informed her of Stage IA Uterine Carcinosarcoma and informed her that she and I should discuss the risks and benefits of adjuvant so that she can determine what is best for her.    She wants to remain off work and feels that the process of bending over is too difficulaty for her to return.  She will determien the date that she wants to return to work and will see me before then so that I can evaluate her and write a return to work letter.    She will inform Rut Osborn to let her know when she wants to come in for visit.  I will ask Rut to call her.    Luis Hayes Jr., M.D., M.S.  Professor, Gynecologic Oncology  Obstetrics, Gynecology and Women's Health   [employer]  Chief Medical Officer  [facility] and [facility]       What Type Of Note Output Would You Prefer (Optional)?: Standard Output How Severe Are Your Spot(S)?: mild Have Your Spot(S) Been Treated In The Past?: has not been treated Hpi Title: Evaluation of Skin Lesions

## 2023-08-16 ENCOUNTER — MEDICAL CORRESPONDENCE (OUTPATIENT)
Dept: HEALTH INFORMATION MANAGEMENT | Facility: CLINIC | Age: 73
End: 2023-08-16
Payer: COMMERCIAL

## 2023-08-17 ENCOUNTER — TRANSCRIBE ORDERS (OUTPATIENT)
Dept: OTHER | Age: 73
End: 2023-08-17

## 2023-08-17 ENCOUNTER — PATIENT OUTREACH (OUTPATIENT)
Dept: ONCOLOGY | Facility: CLINIC | Age: 73
End: 2023-08-17
Payer: COMMERCIAL

## 2023-08-17 DIAGNOSIS — C69.32 CHOROIDAL MALIGNANT MELANOMA, LEFT (H): Primary | ICD-10-CM

## 2023-08-17 NOTE — PROGRESS NOTES
New Patient Oncology Nurse Navigator Note     Referring provider: Dr. 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 July 13, 2023: Presents to ophthalmologist with 2 to 3-month history of left eye blurry vision with exam raising concern for left eye melanoma. ENT consultation same day for 1 year of progressive hoarseness with normal laryngoscopy and felt clinically to be related to reflux and initiated on proton pump inhibitor.  2. July 27, 2023: Ophthalmology and retinal specialist consultation with exam suspicious for choroidal melanoma of the left eye with serious retinal detachment secondary to the melanoma.  3. August 2, 2023: CT chest/abdomen/pelvis with IV contrast negative for metastatic disease.  4. August 11, 2023: MRI head shows posterior left lobe mass measuring 2.1 x 0.9 x 1.2 cm but is otherwise negative; no intracranial metastases noted.  5. August 15, 2023: PET scan shows hypermetabolic activity in left paracentral region of the anterior larynx. No distant metastatic disease noted. CBC, CMP, LDH normal with the exception of .     8/16/23 patient seen by Dr. Ko at [facility] in [place]. Referral placed for left eye melanoma for recommendation of removal.     Records Location: Care Everywhere and See Bookmarked material     Records Needed: NA     Additional testing needed prior to consult: NA    Payor: MEDICARE / Plan: MEDICARE / Product Type: Medicare /     August 17, 2023  Called patient to introduced myself and role as nurse navigator with [facility] Hematology/Oncology department and to inform them that we have  received the referral for a diagnosis of Choroidal Melanoma from Dr. Ko. Unable to reach patient and or leave a message. Referral forwarded to NPS to schedule.     August 21, 2023  Called patient this morning in follow up of phone called placed. Patient needing to be scheduled with opthalmology at [facility] to discuss removal of her eye.   Requested that NPS send referral to Department of Ophthamology & Visual Neurosiences. Updated patient and provided them with contact information and encouraged them to call with any questions or concerns.     Gardenia Mireles, RN, BSN  [facility] Hematology/Oncology Nurse Navigator  [phone]

## 2023-08-22 ENCOUNTER — TELEPHONE (OUTPATIENT)
Dept: OPHTHALMOLOGY | Facility: CLINIC | Age: 73
End: 2023-08-22
Payer: COMMERCIAL

## 2023-08-22 ENCOUNTER — TRANSCRIBE ORDERS (OUTPATIENT)
Dept: OTHER | Age: 73
End: 2023-08-22

## 2023-08-22 DIAGNOSIS — C69.32 CHOROIDAL MALIGNANT MELANOMA, LEFT (H): Primary | ICD-10-CM

## 2023-08-22 NOTE — TELEPHONE ENCOUNTER
[facility] Call Center    Phone Message    May a detailed message be left on voicemail: yes     Reason for Call: Appointment Intake    Referring Provider Name: Referred by:  Dr Juan C Ko at [facility] in [facility]     Diagnosis and/or Symptoms:   Melanoma of eye        Priority 1-2 weeks. Per protocols send high priority TE. Please call  to schedule.     Action Taken: Other: eye    Travel Screening: Not Applicable

## 2023-08-24 NOTE — TELEPHONE ENCOUNTER
Pt scheduled with Dr. Amaro for 8/28/23 at 11:30 AM.  Appt date/time/location and duration expectation relayed to pt's .  Map emailed to pt.    BOB Givens 10:22 AM August 24, 2023

## 2023-08-28 ENCOUNTER — OFFICE VISIT (OUTPATIENT)
Dept: OPHTHALMOLOGY | Facility: CLINIC | Age: 73
End: 2023-08-28
Attending: OPHTHALMOLOGY
Payer: COMMERCIAL

## 2023-08-28 DIAGNOSIS — H40.001 GLAUCOMA SUSPECT OF RIGHT EYE: Primary | ICD-10-CM

## 2023-08-28 DIAGNOSIS — C69.32 CHOROIDAL MALIGNANT MELANOMA, LEFT (H): ICD-10-CM

## 2023-08-28 DIAGNOSIS — C69.32 CHOROIDAL MALIGNANT MELANOMA, LEFT (H): Primary | ICD-10-CM

## 2023-08-28 PROCEDURE — 99204 OFFICE O/P NEW MOD 45 MIN: CPT | Mod: GC | Performed by: OPHTHALMOLOGY

## 2023-08-28 PROCEDURE — 92250 FUNDUS PHOTOGRAPHY W/I&R: CPT | Performed by: OPHTHALMOLOGY

## 2023-08-28 PROCEDURE — 92133 CPTRZD OPH DX IMG PST SGM ON: CPT | Mod: 26 | Performed by: OPHTHALMOLOGY

## 2023-08-28 PROCEDURE — 99207 OCT RETINA SPECTRALIS OU (BOTH EYE): CPT | Mod: 26 | Performed by: OPHTHALMOLOGY

## 2023-08-28 PROCEDURE — 92134 CPTRZ OPH DX IMG PST SGM RTA: CPT | Performed by: OPHTHALMOLOGY

## 2023-08-28 PROCEDURE — 76510 OPH US DX B-SCAN&QUAN A-SCAN: CPT | Performed by: OPHTHALMOLOGY

## 2023-08-28 PROCEDURE — 92133 CPTRZD OPH DX IMG PST SGM ON: CPT | Performed by: OPHTHALMOLOGY

## 2023-08-28 PROCEDURE — 99207 FUNDUS PHOTOS OU (BOTH EYES): CPT | Mod: 26 | Performed by: OPHTHALMOLOGY

## 2023-08-28 PROCEDURE — G0463 HOSPITAL OUTPT CLINIC VISIT: HCPCS | Performed by: OPHTHALMOLOGY

## 2023-08-28 RX ORDER — BRIMONIDINE TARTRATE 1.5 MG/ML
1 SOLUTION/ DROPS OPHTHALMIC 2 TIMES DAILY
Qty: 5 ML | Refills: 11 | Status: SHIPPED | OUTPATIENT
Start: 2023-08-28

## 2023-08-28 RX ORDER — OMEPRAZOLE 40 MG/1
40 CAPSULE, DELAYED RELEASE ORAL DAILY
COMMUNITY

## 2023-08-28 RX ORDER — DORZOLAMIDE HYDROCHLORIDE AND TIMOLOL MALEATE 20; 5 MG/ML; MG/ML
1 SOLUTION/ DROPS OPHTHALMIC 2 TIMES DAILY
Qty: 10 ML | Refills: 11 | Status: SHIPPED | OUTPATIENT
Start: 2023-08-28

## 2023-08-28 RX ORDER — LATANOPROST 50 UG/ML
1 SOLUTION/ DROPS OPHTHALMIC DAILY
COMMUNITY

## 2023-08-28 ASSESSMENT — VISUAL ACUITY
CORRECTION_TYPE: GLASSES
OD_CC: 20/50
METHOD: SNELLEN - LINEAR
OD_CC+: -2
OS_CC: LP WITH PROJECTION

## 2023-08-28 ASSESSMENT — CONF VISUAL FIELD
OS_INFERIOR_TEMPORAL_RESTRICTION: 1
OS_SUPERIOR_TEMPORAL_RESTRICTION: 1
OD_SUPERIOR_TEMPORAL_RESTRICTION: 0
OD_INFERIOR_TEMPORAL_RESTRICTION: 0
OD_NORMAL: 1
OS_SUPERIOR_NASAL_RESTRICTION: 1
OS_INFERIOR_NASAL_RESTRICTION: 1
OD_INFERIOR_NASAL_RESTRICTION: 0
OD_SUPERIOR_NASAL_RESTRICTION: 0

## 2023-08-28 ASSESSMENT — TONOMETRY
OS_IOP_MMHG: 22
OD_IOP_MMHG: 27
IOP_METHOD: TONOPEN

## 2023-08-28 ASSESSMENT — REFRACTION_WEARINGRX
OD_AXIS: 128
OS_SPHERE: -1.00
OS_ADD: +2.50
SPECS_TYPE: PAL
OS_AXIS: 053
OD_CYLINDER: +0.50
OD_ADD: +2.50
OS_CYLINDER: +0.50
OD_SPHERE: -1.00

## 2023-08-28 ASSESSMENT — SLIT LAMP EXAM - LIDS
COMMENTS: NORMAL
COMMENTS: NORMAL

## 2023-08-28 ASSESSMENT — EXTERNAL EXAM - LEFT EYE: OS_EXAM: NORMAL

## 2023-08-28 ASSESSMENT — CUP TO DISC RATIO
OD_RATIO: 0.9
OS_RATIO: 0.8

## 2023-08-28 ASSESSMENT — EXTERNAL EXAM - RIGHT EYE: OD_EXAM: NORMAL

## 2023-08-28 NOTE — PROGRESS NOTES
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    INTERVAL HISTORY - Initial visit with me. Outside records reviewed from Dr. Ko  She was referred to us by her hematologist Juan C Ko.      She was evaluated by Pierre Lagunsa MD roughly 1 month prior. advised enucleation per patient (records not available)    On July 13, 2023, she presented to outside ophthalmologist with 2 to 3-month history of left eye blurry vision with exam raising concern for left eye melanoma. ENT consultation same day for 1 year of progressive hoarseness with normal laryngoscopy and felt clinically to be related to reflux and initiated on proton pump inhibitor. 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  On August 2, 2023, she underwent CT chest/abdomen/pelvis with IV contrast negative for metastatic disease. On August 11, 2023: MRI head shows posterior left lobe mass measuring 2.1 x 0.9 x 1.2 cm but is otherwise negative; no intracranial metastases noted.  On August 15, 2023: PET scan shows hypermetabolic activity in left paracentral region of the anterior larynx. No distant metastatic disease noted. CBC, CMP, LDH normal with the exception of .       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 On 2/16/18, pt opted for surveillance over adjuvant therapy.     She also has a history of Right breast ductal carcinoma, ER/CO-, Her+; cT2N2 ypT0N0; s/p chemotherapy, mastectomy, trastuzumab. Right breast lump noted on May 2007 by patient. August 2007, Medical exam and ultrasound biopsy showed Invasive ductal carcinoma, grade 3 of 3, estrogen receptor negative,  progesterone receptor negative, HER2/barber 2+ by IHC, positive by FISH (ratio 6.0). CA27.29 was 40.7. PET/CT August 31: Hypermetabolic right breast mass and multiple enlarged lymph nodes in the right axilla, three of which are showing increased metabolic activity but no evidence of distant metastatic disease and a 3.4-cm left ovarian cyst. This is clinical stage T2 N2 M0.   On September 2007, she was initiated on neoadjuvant chemotherapy with dose-dense Adriamycin and cyclophosphamide followed by weekly paclitaxel and trastuzumab for 8-week therapy. Paclitaxel therapy shortened due to a good clinical response and patient desire to have surgery prior to Christmas break. In December 2007, she underwent Right mastectomy and axillary lymph node dissection. Pathology: Complete pathologic response in the breast and resected lymph nodes, ypT0 N0 MX. In January 2008, Initiated on every-3-week trastuzumab; completed November 2008 without dose delay or toxicity.           [facility] -   Cristin Ibarra is a  72 year old year-old patient with CMM OS.  Per patient severe vision loss OS starting in August, with initial symptoms in March.  Seen by local ophthalmologist in July 13  and sent to Dr. Pierre Lagunas with [facility] in [facility].     Had CT C/A/P 8/2/23 negative, had PET scan  and MRI head 8/15/23, negative for met (left posterior lobe mass noted but not considered met)      Followed by local oncologist for complicated medical history.    H/o endometrial carcinosarcoma 2017 s/p KELLI/BSO/node dissection  H/o right breast CA s/p chemo, surgery 2007,       Per patient seen by local [facility] 7/2023,  started on oral medication for glaucoma x 5 days then xalatan gtts, not seen subsequently for glaucoma    She was referred to us by her hematologist Juan C Ko.       PAST OCULAR SURGERY  None      RETINAL IMAGING:  OCT 08/28/23  OD - Trace ERM, Preserved contour with no fluid; PVD  OS - Subretinal fluid in macula up to superior  arcade    Fundus Photos 08/28/23  Each eye consistent with exam    U/S OS  A-scan - low reflectivity  B-scan - large mass, no extension, size 9.94 x >16.94L x 19.72T     OCT RNFL  OD - multiple abnormal sectors, global thickness 51    ASSESSMENT & PLAN  # CMM OS   - very likely based on exam   - unlikely to be met from systemic CA   - very large, too big for plaque would need GK   - very poor prognosis given LP vision, severe ExRD   - high likelihood of NVG and pain after Tx     - d/w patient, advise enucleation   - will refer to N[facility]um     - could consider GEP testing after enucleation    # ExRD OS   - cannot r/o combined RRD      # OAG OU    - IOP OD 27 today on xalatan, large CDR   - given monocular and severely abnormal OCT RNFL will Tx aggressively with gtts    - start alphagan 0.15% & cosopt until sees Sheheitli   - will see Sheheitli in October    - patient lives far away, would prefer to follow locally once stable plan established    # PVD OU      # NS OU   - early VS        return to clinic: No follow-ups on file.     ATTESTATION     Attending Attestation:     Complete documentation of historical and exam elements from today's encounter can be found in the full encounter summary report (not reduplicated in this progress note).  I personally obtained the chief complaint(s) and history of present illness.  I confirmed and edited as necessary the review of systems, past medical/surgical history, family history, social history, and examination findings as documented by others; and I examined the patient myself.  I personally reviewed the relevant tests, images, and reports as documented above.  I personally reviewed the ophthalmic test(s) associated with this encounter, agree with the interpretation(s) as documented by the resident/fellow, and have edited the corresponding report(s) as necessary.   I formulated and edited as necessary the assessment and plan and discussed the findings and management plan with  the patient and family    Kayla Amaro MD, PhD  , Vitreoretinal Surgery  Department of Ophthalmology  [facility]

## 2023-08-28 NOTE — NURSING NOTE
Chief Complaints and History of Present Illnesses   Patient presents with    Consult For     Left eye choroidal melanoma referred by Dr Ko     Chief Complaint(s) and History of Present Illness(es)       Consult For              Laterality: left eye    Onset: 6 weeks ago    Course: rapidly worsening    Associated symptoms: eye pain (intermittent) and flashes.  Negative for headache and floaters    Treatments tried: no treatments    Pain scale: 3/10    Comments: Left eye choroidal melanoma referred by Dr Ko              Comments    She states that in the past 6 weeks her vision in the left eye has rapidly decreased.  However, her vision has been blurred since May, when she started using more OTC eye drops for what she thought was dry eye symptoms.      Cristiana Edward, COT 11:19 AM  August 28, 2023

## 2023-08-28 NOTE — Clinical Note
This patient has a large CMM OS.  She cannot be Tx with radiation and you will see her on Wednesday at . I forgot to talk to her about [place] GEP testing; that can be done after enucleation in any case. She has already had body scanning by her local oncologist.

## 2023-08-28 NOTE — Clinical Note
I'm sending you this patient. She has a large CDR and an IOP of 27. Her RNFL OCT is quite abnormal. I treated aggressively with gtts until you can see her since she is monocular.

## 2023-08-28 NOTE — LETTER
8/28/2023       RE: Cristin Ibarra  [address] [place] [address]     Dear Colleague,    Thank you for referring your patient, Cristin Ibarra, to the [facility] [place] at [facility]. Please see a copy of my visit note below.    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    INTERVAL HISTORY - Initial visit with me. Outside records reviewed from Dr. Ko  She was referred to us by her hematologist Juan C Ko.      She was evaluated by Pierre Lagunas MD roughly 1 month prior. advised enucleation per patient (records not available)    On July 13, 2023, she presented to outside ophthalmologist with 2 to 3-month history of left eye blurry vision with exam raising concern for left eye melanoma. ENT consultation same day for 1 year of progressive hoarseness with normal laryngoscopy and felt clinically to be related to reflux and initiated on proton pump inhibitor. 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  On August 2, 2023, she underwent CT chest/abdomen/pelvis with IV contrast negative for metastatic disease. On August 11, 2023: MRI head shows posterior left lobe mass measuring 2.1 x 0.9 x 1.2 cm but is otherwise negative; no intracranial metastases noted.  On August 15, 2023: PET scan shows hypermetabolic activity in left paracentral region of the anterior larynx. No distant metastatic disease noted. CBC, CMP, LDH normal with the exception of .       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 On 2/16/18, pt opted for surveillance over adjuvant therapy.     She also has a history of  Right breast ductal carcinoma, ER/GA-, Her+; cT2N2 ypT0N0; s/p chemotherapy, mastectomy, trastuzumab. Right breast lump noted on May 2007 by patient. August 2007, Medical exam and ultrasound biopsy showed Invasive ductal carcinoma, grade 3 of 3, estrogen receptor negative, progesterone receptor negative, HER2/barber 2+ by IHC, positive by FISH (ratio 6.0). CA27.29 was 40.7. PET/CT August 31: Hypermetabolic right breast mass and multiple enlarged lymph nodes in the right axilla, three of which are showing increased metabolic activity but no evidence of distant metastatic disease and a 3.4-cm left ovarian cyst. This is clinical stage T2 N2 M0.   On September 2007, she was initiated on neoadjuvant chemotherapy with dose-dense Adriamycin and cyclophosphamide followed by weekly paclitaxel and trastuzumab for 8-week therapy. Paclitaxel therapy shortened due to a good clinical response and patient desire to have surgery prior to Christmas break. In December 2007, she underwent Right mastectomy and axillary lymph node dissection. Pathology: Complete pathologic response in the breast and resected lymph nodes, ypT0 N0 MX. In January 2008, Initiated on every-3-week trastuzumab; completed November 2008 without dose delay or toxicity.     [facility] -   Cristin Ibarra is a  72 year old year-old patient with CMM OS.  Per patient severe vision loss OS starting in August, with initial symptoms in March.  Seen by local ophthalmologist in July 13  and sent to Dr. Pierre Lagunas with [facility] in [facility].     Had CT C/A/P 8/2/23 negative, had PET scan  and MRI head 8/15/23, negative for met (left posterior lobe mass noted but not considered met)      Followed by local oncologist for complicated medical history.    H/o endometrial carcinosarcoma 2017 s/p KELLI/BSO/node dissection  H/o right breast CA s/p chemo, surgery 2007,       Per patient seen by local [facility] 7/2023,  started on oral medication for glaucoma x 5 days then xalatan gtts, not seen  subsequently for glaucoma    She was referred to us by her hematologist Juan C Ko.     PAST OCULAR SURGERY  None    RETINAL IMAGING:  OCT 08/28/23  OD - Trace ERM, Preserved contour with no fluid; PVD  OS - Subretinal fluid in macula up to superior arcade    Fundus Photos 08/28/23  Each eye consistent with exam    U/S OS  A-scan - low reflectivity  B-scan - large mass, no extension, size 9.94 x >16.94L x 19.72T     OCT RNFL  OD - multiple abnormal sectors, global thickness 51    ASSESSMENT & PLAN  # CMM OS   - very likely based on exam   - unlikely to be met from systemic CA   - very large, too big for plaque would need GK   - very poor prognosis given LP vision, severe ExRD   - high likelihood of NVG and pain after Tx     - d/w patient, advise enucleation   - will refer to [facility]     - could consider GEP testing after enucleation    # ExRD OS   - cannot r/o combined RRD      # OAG OU    - IOP OD 27 today on xalatan, large CDR   - given monocular and severely abnormal OCT RNFL will Tx aggressively with gtts    - start alphagan 0.15% & cosopt until sees Sheheitli   - will see Sheheitli in October    - patient lives far away, would prefer to follow locally once stable plan established    # PVD OU      # NS OU   - early VS    return to clinic: No follow-ups on file.     ATTESTATION   Attending Attestation: Complete documentation of historical and exam elements from today's encounter can be found in the full encounter summary report (not reduplicated in this progress note).  I personally obtained the chief complaint(s) and history of present illness.  I confirmed and edited as necessary the review of systems, past medical/surgical history, family history, social history, and examination findings as documented by others; and I examined the patient myself.  I personally reviewed the relevant tests, images, and reports as documented above.  I personally reviewed the ophthalmic test(s) associated with this encounter,  agree with the interpretation(s) as documented by the resident/fellow, and have edited the corresponding report(s) as necessary.   I formulated and edited as necessary the assessment and plan and discussed the findings and management plan with the patient and family. Kayla Amaro MD, PhD      Base Eye Exam       Visual Acuity (Snellen - Linear)         Right Left    Dist cc 20/50 -2 LP with Projection    Dist ph cc NI       Correction: Glasses              Tonometry (Tonopen, 11:35 AM)         Right Left    Pressure 27 22              Pupils         React APD    Right Minimal None    Left Minimal 1+              Visual Fields         Left Right      Full    Restrictions Total superior temporal, inferior temporal, superior nasal, inferior nasal deficiencies               Extraocular Movement         Right Left     Full Full              Neuro/Psych       Oriented x3: Yes    Mood/Affect: Normal              Dilation       Both eyes: 1.0% Mydriacyl, 2.5% Burt Synephrine @ 11:44 AM                  Additional Notes    transillumination (+) inferior, ?extending to CB       Slit Lamp and Fundus Exam       External Exam         Right Left    External Normal Normal              Slit Lamp Exam         Right Left    Lids/Lashes Normal Normal    Conjunctiva/Sclera White and quiet White and quiet, no sentinel vessels, no extension    Cornea Clear Clear    Anterior Chamber Deep and quiet Deep and quiet    Iris Dilated Dilated    Lens 2+ NS 2+ NS    Vitreous PVD 2+ pigmented cell; no haze, PVD              Fundus Exam         Right Left    Disc Thin Rim Thin Rim    C/D Ratio 0.9 0.8    Macula flat detached    Vessels Normal Normal    Periphery Superior cobblestone degeneration; inferior CR scar [address] inferior from 3:00 to 9:00, large pig mass inferior extending from ora to ONH                  Refraction       Wearing Rx         Sphere Cylinder Axis Add    Right -1.00 +0.50 128 +2.50    Left -1.00 +0.50 053 +2.50       Type: PAL                    Again, thank you for allowing me to participate in the care of your patient.      Sincerely,    Kayla Amaro MD, PhD  , Vitreoretinal Surgery  Department of Ophthalmology & Visual Neurosciences  [facility]

## 2023-08-30 ENCOUNTER — OFFICE VISIT (OUTPATIENT)
Dept: OPHTHALMOLOGY | Facility: CLINIC | Age: 73
End: 2023-08-30
Payer: COMMERCIAL

## 2023-08-30 DIAGNOSIS — C69.32 CHOROIDAL MALIGNANT MELANOMA, LEFT (H): Primary | ICD-10-CM

## 2023-08-30 DIAGNOSIS — H40.001 GLAUCOMA SUSPECT OF RIGHT EYE: ICD-10-CM

## 2023-08-30 PROCEDURE — 99214 OFFICE O/P EST MOD 30 MIN: CPT | Mod: GC | Performed by: OPHTHALMOLOGY

## 2023-08-30 ASSESSMENT — CONF VISUAL FIELD
OD_INFERIOR_NASAL_RESTRICTION: 0
OD_SUPERIOR_NASAL_RESTRICTION: 0
OD_NORMAL: 1
OS_SUPERIOR_NASAL_RESTRICTION: 1
OD_INFERIOR_TEMPORAL_RESTRICTION: 0
OS_INFERIOR_NASAL_RESTRICTION: 1
OD_SUPERIOR_TEMPORAL_RESTRICTION: 0
OS_SUPERIOR_TEMPORAL_RESTRICTION: 1
OS_INFERIOR_TEMPORAL_RESTRICTION: 1

## 2023-08-30 ASSESSMENT — TONOMETRY
OD_IOP_MMHG: 13
IOP_METHOD: ICARE
OS_IOP_MMHG: 16

## 2023-08-30 ASSESSMENT — VISUAL ACUITY
OD_CC: 20/40
OD_CC+: -1
OS_CC: LP
CORRECTION_TYPE: GLASSES
METHOD: SNELLEN - LINEAR

## 2023-08-30 ASSESSMENT — SLIT LAMP EXAM - LIDS
COMMENTS: NORMAL
COMMENTS: NORMAL

## 2023-08-30 ASSESSMENT — EXTERNAL EXAM - LEFT EYE: OS_EXAM: NORMAL

## 2023-08-30 ASSESSMENT — EXTERNAL EXAM - RIGHT EYE: OD_EXAM: NORMAL

## 2023-08-30 NOTE — PROGRESS NOTES
Oculoplastic Clinic New Patient    Patient: Cristin Ibarra MRN# [id number]   YOB: 1950 Age: 72 year old   Date of Visit: Aug 30, 2023    CC: Blind painful eye    Chief Complaint(s) and History of Present Illness(es)     Consult For            Laterality: left eye    Comments: Enucleation, left eye           Comments    Patient referred by Dr. Amaro for enucleation evaluation, left eye.  Seen on 08/28/23 with Dr Amaro, records in Epic.  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   Will see Arjun in October for glaucoma.                     HPI:     Cristin Ibarra is a 72 year old female who has limited visual potential in the left eye and has experienced significant discomfort associated with it which has been refractory to conservative management. Consideration is being given to removing the eye. The limited visual potential is due to choroidal melanoma.             PAST OCULAR HISTORY:     No past ocular surgery         Assessment and Plan:     1. Choroidal malignant melanoma, left (H)    2. Glaucoma suspect of right eye        Had CT C/A/P 8/2/23 negative, had PET scan  and MRI head 8/15/23, negative for met (left posterior lobe mass noted but not considered met)      Followed by local oncologist for complicated medical history.    H/o endometrial carcinosarcoma 2017 s/p KELLI/BSO/node dissection  H/o right breast CA s/p chemo, surgery 2007    Now referred with likely choroidal melanoma with very poor prognosis given LP vision and severe ExRD.  Enucleation with placement of silicone implant.    Not interested in castle testing.    We discussed the typical postoperative care, including the need for a patch for 4-6 days. After this she will have a clear conformer in. About 8 weeks later I will see her back and she can schedule an appointment with the  to make a prosthesis any time after that.     ANTICOAGULATION:    Can hold prior to surgery enucleation with placement  of silicone implant.    Will cc PCP regarding holding anticoagulation          Cass Hagen,  Oculoplastic Surgery Fellow    Attending Physician Attestation: Complete documentation of historical and exam elements from today's encounter can be found in the full encounter summary report (not reduplicated in this progress note). I personally obtained the chief complaint(s) and history of present illness. I confirmed and edited as necessary the review of systems, past medical/surgical history, family history, social history, and examination findings as documented by others; and I examined the patient myself. I personally reviewed the relevant tests, images, and reports as documented above. I formulated and edited as necessary the assessment and plan and discussed the findings and management plan with the patient. Javier Mosher MD      Today with Cristin Ibarra I reviewed the indications, risks, benefits, and alternatives of the proposed surgical procedure including, but not limited to, failure obtain the desired result and need for additional surgery, bleeding, infection, and the remote possibility of permanent damage to any organ system or death with the use of anesthesia. With implants, there is always a risk of implant related complications including exposure, extrusion, infection, and need for more surgery or medications. I provided multiple opportunities for the questions, answered all questions to the best of my ability, and confirmed that my answers and my discussion were understood.

## 2023-08-30 NOTE — LETTER
2023         RE:  :  MRN: Cristin Ibarra  1950  [id number]     Dear Dr. Amaro,    Thank you for asking me to see your patient, Cristin Ibarra, for an oculoplastic   consultation.  My assessment and plan are below.  For further details, please see my attached clinic note.      Oculoplastic Clinic New Patient     Patient: Cristin Ibarra MRN# [id number]   YOB: 1950 Age: 72 year old   Date of Visit: Aug 30, 2023     CC: Blind painful eye     Chief Complaint(s) and History of Present Illness(es)     Consult For            Laterality: left eye    Comments: Enucleation, left eye           Comments    Patient referred by Dr. Amaro for enucleation evaluation, left eye.  Seen on 23 with Dr Amaro, records in Epic.  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   Will see [facility] in October for glaucoma.                       HPI:      Cristin Ibarra is a 72 year old female who has limited visual potential in the left eye and has experienced significant discomfort associated with it which has been refractory to conservative management. Consideration is being given to removing the eye. The limited visual potential is due to choroidal melanoma.             PAST OCULAR HISTORY:      No past ocular surgery          Assessment and Plan:      1. Choroidal malignant melanoma, left (H)    2. Glaucoma suspect of right eye          Had CT C/A/P 23 negative, had PET scan  and MRI head 8/15/23, negative for met (left posterior lobe mass noted but not considered met)       Followed by local oncologist for complicated medical history.    H/o endometrial carcinosarcoma  s/p KELLI/BSO/node dissection  H/o right breast CA s/p chemo, surgery      Now referred with likely choroidal melanoma with very poor prognosis given LP vision and severe ExRD.  Enucleation with placement of silicone implant.    Not interested in castle testing.     We discussed the typical  postoperative care, including the need for a patch for 4-6 days. After this she will have a clear conformer in. About 8 weeks later I will see her back and she can schedule an appointment with the  to make a prosthesis any time after that.      ANTICOAGULATION:     Can hold prior to surgery enucleation with placement of silicone implant.    Will cc PCP regarding holding anticoagulation                Again, thank you for allowing me to participate in the care of your patient.      Sincerely,    Javier Mosher MD  Department of Ophthalmology and Visual Neurosciences  [facility]    CC: Kayla Amaro MD  [address] [place] [id number]  Via In Basket

## 2023-08-30 NOTE — NURSING NOTE
Chief Complaints and History of Present Illnesses   Patient presents with    Consult For     Enucleation, left eye        Chief Complaint(s) and History of Present Illness(es)       Consult For              Laterality: left eye    Comments: Enucleation, left eye               Comments    Patient referred by Dr. Amaro for enucleation evaluation, left eye.  Seen on 08/28/23 with Dr Amaro, records in Epic.  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   Will see [facility] in October for glaucoma.                      Kapil Edward, Ophthalmic Assistant

## 2023-09-06 NOTE — H&P (VIEW-ONLY)
ADULT PREOPERATIVE ASSESSMENT  History of Present Illness  Ally is here today for a preoperative consultation to evaluate the patient's perioperative risk prior to surgery.   Condition requiring surgery: Choroidal malignant melanoma, left   Date of Surgery: 9/11/2023  Surgeon: Javier Mosher MD  Procedure: Left, enucleation with placement of silicone implant  Facility: [facility]   Fax: [phone]  Subjective   Do you ever have any pain or discomfort in your chest? no  Do you have swelling in your feet or ankles at times? no  Are you troubled by shortness of breath when:       Walking on the level? no        Walking up flight of stairs or slight hill? no       Sleeping at night? no  Do you sometimes get pains in the calves of your legs when you walk? no  Does your chest ever sound wheezy or whistling? no  Do you currently or in the last 2 weeks, have you had, a cold, bronchitis or other respiratory infection? no  Do you usually have a cough? no  Do you or does anyone in your family have a bleeding or clotting problem? no  Have you taken any aspirin, other blood thinners, or arthritis medicine in the last 2 weeks? no  Have you ever had problems with anemia or been told to take iron pills? no  Have you had any black, tarry or bloody stools, (for women) abnormal vaginal bleeding? no  Have you or any of your relatives ever had problems with anesthesia? no  For Women: When was your last period? No LMP recorded. Patient is postmenopausal.        Surgical Risk Factors:  Relevant Chronic conditions: Tobacco dependency, left laryngeal lesion, awaiting excision. These conditions are stable.  History of right breast and endometrial carcinoma.  Ally does not have a history of heart disease.  Cardiovascular Risk Factors: tobacco use, hypertension and dyslipidemia  She has history of lung disease: Smoker.  History of Sleep Apnea: no      Functional Capacity Information: *  Patient can walk up a flight of steps or a hill (4 METs)     Bleeding: no personal history of bleeding disorder or blood clots  Anesthesia: no personal history anesthesia complications    Patient Active Problem List   Diagnosis     Hyperlipidemia     Chronic low back pain     Tobacco dependency     Irritable bowel syndrome     Osteopenia     Ovarian cyst     Tubulovillous adenoma     Noninfectious lymphedema     Generalized pain     Choroidal malignant melanoma, left (HCC)     HER2-positive carcinoma of right breast (HCC)     Carcinosarcoma of endometrium (HCC)     Past Surgical History:   Procedure Laterality Date     BREAST BIOPSY      Negative, right side.     FRACTURE TREATMENT      Vertebral- followed by Dr Lopes     HYSTERECTOMY       MODIFIED RADICAL MASTECTOMY W NODE DISSECTION  12/07    Scott, right     OOPHORECTOMY       TONSIL AND ADENOIDECTOMY       TUBAL LIGATION       Current Outpatient Medications   Medication Sig     Ascorbic Acid-Multivits-Min (EMERGEN-C) 1,000 mg oral Powder Effervescent in Packet Take by mouth if needed.     brimonidine (ALPHAGAN) 0.15 % ophthalmic Drops INSTILL ONE DROP INTO THE RIGHT EYE TWO TIMES A DAY     dorzolamide-timoloL (COSOPT) 22.3-6.8 mg/mL ophthalmic Drops INSTILL ONE DROP INTO THE RIGHT EYE TWO TIMES A DAY     latanoprost (XALATAN) 0.005 % ophthalmic Drops      MULTI-VITAMIN ORAL 1 Tab by Oral route Once daily.      omeprazole (PRILOSEC) 40 mg oral Capsule, Delayed Release(E.C.) TAKE ONE CAPSULE BY MOUTH TWICE A DAY 30 MINUTES BEFORE MEALS     varenicline (CHANTIX) 0.5 mg (11)- 1 mg (42) oral Tablets, Dose Pack Take by mouth, 1 tablet (0.5 mg) daily for 3 days, then 1 tablet (0.5 mg) twice daily for 4 days, then 1 tablet (1 mg) twice daily.     Allergies as of 09/06/2023     (No Known Allergies)     Social History     Tobacco Use     Smoking status: Every Day     Packs/day: .25     Types: Cigarettes     Smokeless tobacco: Never     Tobacco comments:      Requesting information   Substance Use Topics     Alcohol use: Yes     Alcohol/week: 0.8 standard drinks of alcohol     Types: 1 Cans of beer per week     Comment: OCC        Review of Systems   A 10+ Review of Systems was completed and is negative unless noted in above in history.     Objective   /76 (BP Source: L arm, BP position: Sitting)   Pulse 67   Wt 120 lb 11.2 oz (54.7 kg)   SpO2 97%   BMI 20.36 kg/m     Exam  GENERAL: No acute distress.  SKIN:  No rashes, jaundice or suspicious lesions.  HEENT:  Head is atraumatic and normocephalic. Conjunctivae normal and sclera clear.  Ear canals clear. TMs clear with normal landmarks. No oral lesions.  Throat without erythema or exudate.   NECK:  Neck supple without thyromegaly  LYMPH NODES:  No cervical or supraclavicular adenopathy.  LUNGS:  Clear with normal respirations. No crackles or wheezing.  HEART:  Regular without murmurs. No edema.   ABDOMEN:  Soft, nontender and nondistended with normal bowel sounds. No hepatosplenomegaly or masses.  MSK:  Normal muscle bulk. No effusions.  NEURO:  Alert and attentive. No tremor. Gait is normal.        Personally reviewed each individual lab.   EC23: NSR        Assessment/Plan  1. Preop examination  ELECTROCARDIOGRAM, ROUTINE ECG W/AT LEAST12 LEADS; W/INTERPRETATION AND REPORT    HEMOGLOBIN    POTASSIUM      2. Choroidal malignant melanoma, left (HCC)        3. Laryngeal mass  Established.  Will be completing laryngeal mass excision/biopsy through ENT specialists shortly after enucleation.        4. Tobacco dependency  Established, poor control: Continues to smoke.  Cessation is encouraged.        Ally is a 72 Y female who is scheduled for the above listed procedure.  Procedure Risk: low  Patient is at moderate risk due to the risk factors noted above.    Optimized for surgery from a primary care perspective.  The patient was instructed on how to take their medications the day prior and on the date of the  surgery.        Anticoagulation Management:Not applicable  Stress dose steroids: Not applicable    A copy of this report will be sent to the requesting surgeon and the surgical center.     Patient Instructions   Instructed patient to cancel surgery and reschedule if develops high fever or is vomiting 1-3 days before surgery. Pre Operative History and Physical is good for 30 days. May need additional blood work--contact primary provider.    Other Pre-Op Orders for when to stop eating the night before surgery, time of surgery, where & when to check in, parking, and any other specific pre-operative orders come from the surgeon's office. You should hear from them at least one day before surgery if not sooner for these specific instructions.    STOP any types of over the counter blood thinning medications (such as aspirin, Motrin/ibuprofen, Aleve/naproxen, vitamin E, or fish oil) and all herbal remedies 1 week prior to surgery. Tylenol (or acetaminophen) is okay to take for pain if needed     Recommend no alcohol consumption at least 72 hours prior to surgery    TAKE the following medications the AM of surgery with small sip of water (blood pressure, heart or anti-seizure medications): Prilosec    HOLD all other medications AM of surgery.          This document was created by Esthela Rose LPN, on behalf of LINDSAY Benito,CNP  based on provider statements of personally performed services and decisions. Esthela Rose LPN 9/6/2023 2:45 PM     Provider:  This document, reviewed and approved for accuracy by myself, reflects services I personally performed and decisions I made in this encounter.

## 2023-09-07 ENCOUNTER — ANESTHESIA EVENT (OUTPATIENT)
Dept: SURGERY | Facility: AMBULATORY SURGERY CENTER | Age: 73
End: 2023-09-07
Payer: COMMERCIAL

## 2023-09-08 NOTE — ANESTHESIA PREPROCEDURE EVALUATION
Anesthesia Pre-Procedure Evaluation    Patient: Cristin Ibarra   MRN: [id number] : 1950        Procedure : Procedure(s):  Left enucleation with implant          Past Medical History:   Diagnosis Date    Hyperlipidemia     Irritable bowel syndrome     Malignant neoplasm of female breast (H)     Invasive ductal carcinoma, s/p chemo, radical mastectomy       Past Surgical History:   Procedure Laterality Date    DAVINCI HYSTERECTOMY TOTAL, BILATERAL SALPINGO-OOPHORECTOMY, COMBINED N/A 2018    Procedure: COMBINED DAVINCI HYSTERECTOMY TOTAL, SALPINGO-OOPHORECTOMY;  DaVinci Total Laparoscopic Hysterectomy, Bilateral Salpingo-Oophorectomy, Cystoscopy, Paraaortic and bilateral pelvic Lymph Node Dissection, Omental biopsy, episiotomy repair, pelvic washings, Anesthesia Block;  Surgeon: Luis Hayes MD;  Location: [facility] OR    FRACTURE SURGERY      LAPAROSCOPIC HYSTERECTOMY TOTAL, BILATERAL SALPINGO-OOPHORECTOMY, COMBINED  2018    Robotic TLH, BSO, PPALND, Pelvic washings, minilaparotomy, 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    MASTECTOMY MODIFIED RADICAL  2007    TUBAL LIGATION        No Known Allergies   Social History     Tobacco Use    Smoking status: Every Day    Smokeless tobacco: Never   Substance Use Topics    Alcohol use: Yes      Wt Readings from Last 1 Encounters:   23 53.2 kg (117 lb 4.6 oz)           Physical Exam    Airway        Mallampati: II   TM distance: > 3 FB   Neck ROM: full   Mouth opening: > 3 cm    Respiratory Devices and Support         Dental     Comment: Loose front upper right tooth and repaired    (+) Modest Abnormalities - crowns, retainers, 1 or 2 missing teeth and Removable bridges or other hardware      Cardiovascular             Pulmonary               Other findings: 18; 0804; Mask Ventilation: Easy with oral airway; Ease of Intubation: Easy; Airway Size: 7;  Cuffed;  Oral;  Blade Type: Gonzales;  Blade Size: 3;  Place by: Ashley;  Insertion Attempts:  1;  Secured at (cm)to lip: 22 cm;  Breath Sounds: Equal, clear and bilateral;  End Tidal CO2: Present;  Dentition: Intact, Unchanged;  Grade View of Cords: 1    OUTSIDE LABS:  CBC:   Lab Results   Component Value Date    WBC 7.2 01/05/2018    HGB 13.6 01/05/2018    HCT 40.4 01/05/2018     01/05/2018     BMP:   Lab Results   Component Value Date     01/05/2018    POTASSIUM 3.6 01/05/2018    CHLORIDE 107 01/05/2018    CO2 22 01/05/2018    BUN 8 01/05/2018    CR 0.53 01/05/2018     (H) 01/05/2018     COAGS: No results found for: PTT, INR, FIBR  POC:   Lab Results   Component Value Date     (H) 01/12/2018     HEPATIC:   Lab Results   Component Value Date    ALBUMIN 3.6 01/05/2018    PROTTOTAL 7.4 01/05/2018    ALT 17 01/05/2018    AST 17 01/05/2018    ALKPHOS 79 01/05/2018    BILITOTAL 0.2 01/05/2018     OTHER:   Lab Results   Component Value Date    DAYANA 8.8 01/05/2018       Anesthesia Plan    ASA Status:  2    NPO Status:  NPO Appropriate    Anesthesia Type: General.     - Airway: LMA   Induction: Intravenous, Propofol.   Maintenance: Balanced.        Consents    Anesthesia Plan(s) and associated risks, benefits, and realistic alternatives discussed. Questions answered and patient/representative(s) expressed understanding.     - Discussed:     - Discussed with:  Patient      - Extended Intubation/Ventilatory Support Discussed: No.      - Patient is DNR/DNI Status: No     Use of blood products discussed: No .     Postoperative Care    Pain management: IV analgesics, Oral pain medications, Multi-modal analgesia.   PONV prophylaxis: Dexamethasone or Solumedrol, Ondansetron (or other 5HT-3), Background Propofol Infusion     Comments:                Brandon Sinclair MD

## 2023-09-11 ENCOUNTER — ANESTHESIA (OUTPATIENT)
Dept: SURGERY | Facility: AMBULATORY SURGERY CENTER | Age: 73
End: 2023-09-11
Payer: COMMERCIAL

## 2023-09-11 ENCOUNTER — HOSPITAL ENCOUNTER (OUTPATIENT)
Facility: AMBULATORY SURGERY CENTER | Age: 73
Discharge: HOME | End: 2023-09-11
Attending: OPHTHALMOLOGY | Admitting: OPHTHALMOLOGY
Payer: COMMERCIAL

## 2023-09-11 VITALS
DIASTOLIC BLOOD PRESSURE: 72 MMHG | WEIGHT: 117.28 LBS | HEIGHT: 65 IN | BODY MASS INDEX: 19.54 KG/M2 | OXYGEN SATURATION: 99 % | RESPIRATION RATE: 18 BRPM | TEMPERATURE: 98 F | SYSTOLIC BLOOD PRESSURE: 135 MMHG | HEART RATE: 61 BPM

## 2023-09-11 DIAGNOSIS — C69.32 CHOROIDAL MALIGNANT MELANOMA, LEFT (H): Primary | ICD-10-CM

## 2023-09-11 PROCEDURE — G8916 PT W IV AB GIVEN ON TIME: HCPCS

## 2023-09-11 PROCEDURE — G8907 PT DOC NO EVENTS ON DISCHARG: HCPCS

## 2023-09-11 PROCEDURE — 67875 CLOSURE OF EYELID BY SUTURE: CPT | Mod: LT

## 2023-09-11 PROCEDURE — 67875 CLOSURE OF EYELID BY SUTURE: CPT | Mod: LT | Performed by: OPHTHALMOLOGY

## 2023-09-11 PROCEDURE — 65105 REMOVE EYE/ATTACH IMPLANT: CPT | Mod: LT

## 2023-09-11 PROCEDURE — 65105 REMOVE EYE/ATTACH IMPLANT: CPT | Mod: LT | Performed by: OPHTHALMOLOGY

## 2023-09-11 DEVICE — AN IMPLANTABLE OCULAR DEVICE DESIGNED TO PERMANENTLY FILL THE ORBITAL CAVITY FOLLOWING ENUCLEATION, EVISCERATION, OR AFTER THE REMOVAL OF ANOTHER OCULAR IMPLANT (USED AS A SECONDARY IMPLANT), TO REPLACE THE VOLUME AND POSSIBLY, GIVEN THE SURGICAL METHOD, TO IMPART MOTION TO THE EVENTUAL OCULAR PROSTHESIS (THE ARTIFICIAL EYEBALL). IT IS TYPICALLY ASPHERICAL AND MAY HAVE POROUS SURFACES TO FACILITATE COLONIZATION BY FIBROVASCULAR TISSUE TO OFFER THE ADVANTAGES OF REDUCED RISK OF INFECTION OR IMPLANT EXTRUSION. IT IS AVAILABLE IN VARIOUS SIZES AND IS TYPICALLY MADE OF POLYMETHYLMETHACRYLATE (PMMA), POLY 2-HYDROXYETHYLMETHACRYLATE (PHEMA), OR SILICONE.
Type: IMPLANTABLE DEVICE | Site: EYE | Status: FUNCTIONAL
Brand: ORBITAL SPHERE IMPLANT

## 2023-09-11 RX ORDER — OXYCODONE HYDROCHLORIDE 5 MG/1
5 TABLET ORAL EVERY 6 HOURS PRN
Qty: 15 TABLET | Refills: 0 | Status: SHIPPED | OUTPATIENT
Start: 2023-09-11 | End: 2023-09-14

## 2023-09-11 RX ORDER — CEFAZOLIN SODIUM 2 G/100ML
2 INJECTION, SOLUTION INTRAVENOUS SEE ADMIN INSTRUCTIONS
Status: DISCONTINUED | OUTPATIENT
Start: 2023-09-11 | End: 2023-09-12 | Stop reason: HOSPADM

## 2023-09-11 RX ORDER — FENTANYL CITRATE 50 UG/ML
50 INJECTION, SOLUTION INTRAMUSCULAR; INTRAVENOUS EVERY 5 MIN PRN
Status: DISCONTINUED | OUTPATIENT
Start: 2023-09-11 | End: 2023-09-12 | Stop reason: HOSPADM

## 2023-09-11 RX ORDER — DEXAMETHASONE SODIUM PHOSPHATE 4 MG/ML
INJECTION, SOLUTION INTRA-ARTICULAR; INTRALESIONAL; INTRAMUSCULAR; INTRAVENOUS; SOFT TISSUE PRN
Status: DISCONTINUED | OUTPATIENT
Start: 2023-09-11 | End: 2023-09-11

## 2023-09-11 RX ORDER — ONDANSETRON 2 MG/ML
INJECTION INTRAMUSCULAR; INTRAVENOUS PRN
Status: DISCONTINUED | OUTPATIENT
Start: 2023-09-11 | End: 2023-09-11

## 2023-09-11 RX ORDER — ACETAMINOPHEN 325 MG/1
975 TABLET ORAL ONCE
Status: COMPLETED | OUTPATIENT
Start: 2023-09-11 | End: 2023-09-11

## 2023-09-11 RX ORDER — CEFAZOLIN SODIUM 2 G/100ML
2 INJECTION, SOLUTION INTRAVENOUS
Status: COMPLETED | OUTPATIENT
Start: 2023-09-11 | End: 2023-09-11

## 2023-09-11 RX ORDER — PROPOFOL 10 MG/ML
INJECTION, EMULSION INTRAVENOUS PRN
Status: DISCONTINUED | OUTPATIENT
Start: 2023-09-11 | End: 2023-09-11

## 2023-09-11 RX ORDER — OXYCODONE HYDROCHLORIDE 5 MG/1
5 TABLET ORAL
Status: COMPLETED | OUTPATIENT
Start: 2023-09-11 | End: 2023-09-11

## 2023-09-11 RX ORDER — SODIUM CHLORIDE, SODIUM LACTATE, POTASSIUM CHLORIDE, CALCIUM CHLORIDE 600; 310; 30; 20 MG/100ML; MG/100ML; MG/100ML; MG/100ML
INJECTION, SOLUTION INTRAVENOUS CONTINUOUS
Status: DISCONTINUED | OUTPATIENT
Start: 2023-09-11 | End: 2023-09-12 | Stop reason: HOSPADM

## 2023-09-11 RX ORDER — FENTANYL CITRATE 50 UG/ML
25 INJECTION, SOLUTION INTRAMUSCULAR; INTRAVENOUS EVERY 5 MIN PRN
Status: DISCONTINUED | OUTPATIENT
Start: 2023-09-11 | End: 2023-09-12 | Stop reason: HOSPADM

## 2023-09-11 RX ORDER — PROPOFOL 10 MG/ML
INJECTION, EMULSION INTRAVENOUS CONTINUOUS PRN
Status: DISCONTINUED | OUTPATIENT
Start: 2023-09-11 | End: 2023-09-11

## 2023-09-11 RX ORDER — LIDOCAINE 40 MG/G
CREAM TOPICAL
Status: DISCONTINUED | OUTPATIENT
Start: 2023-09-11 | End: 2023-09-12 | Stop reason: HOSPADM

## 2023-09-11 RX ORDER — ONDANSETRON 2 MG/ML
4 INJECTION INTRAMUSCULAR; INTRAVENOUS EVERY 30 MIN PRN
Status: DISCONTINUED | OUTPATIENT
Start: 2023-09-11 | End: 2023-09-12 | Stop reason: HOSPADM

## 2023-09-11 RX ORDER — ERYTHROMYCIN 5 MG/G
OINTMENT OPHTHALMIC
Qty: 3.5 G | Refills: 0 | Status: SHIPPED | OUTPATIENT
Start: 2023-09-15 | End: 2024-05-01

## 2023-09-11 RX ORDER — FENTANYL CITRATE 50 UG/ML
INJECTION, SOLUTION INTRAMUSCULAR; INTRAVENOUS PRN
Status: DISCONTINUED | OUTPATIENT
Start: 2023-09-11 | End: 2023-09-11

## 2023-09-11 RX ORDER — LIDOCAINE HYDROCHLORIDE 20 MG/ML
INJECTION, SOLUTION INFILTRATION; PERINEURAL PRN
Status: DISCONTINUED | OUTPATIENT
Start: 2023-09-11 | End: 2023-09-11

## 2023-09-11 RX ORDER — ONDANSETRON 4 MG/1
4 TABLET, ORALLY DISINTEGRATING ORAL EVERY 30 MIN PRN
Status: DISCONTINUED | OUTPATIENT
Start: 2023-09-11 | End: 2023-09-12 | Stop reason: HOSPADM

## 2023-09-11 RX ORDER — ERYTHROMYCIN 5 MG/G
OINTMENT OPHTHALMIC PRN
Status: DISCONTINUED | OUTPATIENT
Start: 2023-09-11 | End: 2023-09-11 | Stop reason: HOSPADM

## 2023-09-11 RX ORDER — OXYCODONE HYDROCHLORIDE 5 MG/1
10 TABLET ORAL
Status: DISCONTINUED | OUTPATIENT
Start: 2023-09-11 | End: 2023-09-12 | Stop reason: HOSPADM

## 2023-09-11 RX ADMIN — ACETAMINOPHEN 975 MG: 325 TABLET ORAL at 12:56

## 2023-09-11 RX ADMIN — DEXAMETHASONE SODIUM PHOSPHATE 4 MG: 4 INJECTION, SOLUTION INTRA-ARTICULAR; INTRALESIONAL; INTRAMUSCULAR; INTRAVENOUS; SOFT TISSUE at 14:19

## 2023-09-11 RX ADMIN — FENTANYL CITRATE 25 MCG: 50 INJECTION, SOLUTION INTRAMUSCULAR; INTRAVENOUS at 14:40

## 2023-09-11 RX ADMIN — LIDOCAINE HYDROCHLORIDE 60 MG: 20 INJECTION, SOLUTION INFILTRATION; PERINEURAL at 14:06

## 2023-09-11 RX ADMIN — PROPOFOL 150 MG: 10 INJECTION, EMULSION INTRAVENOUS at 14:06

## 2023-09-11 RX ADMIN — FENTANYL CITRATE 25 MCG: 50 INJECTION, SOLUTION INTRAMUSCULAR; INTRAVENOUS at 14:19

## 2023-09-11 RX ADMIN — ONDANSETRON 4 MG: 2 INJECTION INTRAMUSCULAR; INTRAVENOUS at 14:19

## 2023-09-11 RX ADMIN — CEFAZOLIN SODIUM 2 G: 2 INJECTION, SOLUTION INTRAVENOUS at 14:02

## 2023-09-11 RX ADMIN — OXYCODONE HYDROCHLORIDE 5 MG: 5 TABLET ORAL at 15:30

## 2023-09-11 RX ADMIN — PROPOFOL 50 MG: 10 INJECTION, EMULSION INTRAVENOUS at 14:07

## 2023-09-11 RX ADMIN — PROPOFOL 100 MCG/KG/MIN: 10 INJECTION, EMULSION INTRAVENOUS at 14:07

## 2023-09-11 RX ADMIN — SODIUM CHLORIDE, SODIUM LACTATE, POTASSIUM CHLORIDE, CALCIUM CHLORIDE: 600; 310; 30; 20 INJECTION, SOLUTION INTRAVENOUS at 13:28

## 2023-09-11 NOTE — ANESTHESIA CARE TRANSFER NOTE
Patient: Cristin Ibarra    Procedure: Procedure(s):  Left enucleation with implant       Diagnosis: Choroidal malignant melanoma, left (H) [C69.32]  Diagnosis Additional Information: No value filed.    Anesthesia Type:   General     Note:    Oropharynx: oropharynx clear of all foreign objects and spontaneously breathing  Level of Consciousness: drowsy  Oxygen Supplementation: nasal cannula  Level of Supplemental Oxygen (L/min / FiO2): 4  Independent Airway: airway patency satisfactory and stable  Dentition: dentition unchanged  Vital Signs Stable: post-procedure vital signs reviewed and stable  Report to RN Given: handoff report given  Patient transferred to: PACU    Handoff Report: Identifed the Patient, Identified the Reponsible Provider, Reviewed the pertinent medical history, Discussed the surgical course, Reviewed Intra-OP anesthesia mangement and issues during anesthesia, Set expectations for post-procedure period and Allowed opportunity for questions and acknowledgement of understanding    Vitals:  Vitals Value Taken Time   BP     Temp     Pulse     Resp     SpO2         Electronically Signed By: LINDSAY Spears CRNA  September 11, 2023  3:14 PM

## 2023-09-11 NOTE — LETTER
September 21, 2023      Cristin Ibarra  [address] [place] [address]        Dear Cristin,    Please see below for your test results.This confirms the diagnosis of choroidal melanoma.     Resulted Orders   Surgical Pathology Exam   Result Value Ref Range    Case Report       Surgical Pathology Report                         Case: [id number]                                  Authorizing Provider:  Javier Mosher MD      Collected:           09/11/2023 02:33 PM          Ordering Location:     [facility]    Received:            09/11/2023 03:08 PM                                 [place]                                                                     Pathologist:           Stephanie Martinez MD                                                         Specimen:    Eye, Left, Left Eye-Melanoma                                                               Final Diagnosis       A. Eye, left, enucleation:  1. 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  - No evidence of extrascleral extension is identified in the sections examined.  2. Total exudative retinal detachment.  3. Optic disc edema.  4. Mild cortical cataract.      Synoptic Checklist       UVEAL MELANOMA   UVEAL MELANOMA: RESECTION - All Specimens   8th Edition - Protocol posted: 3/23/2022      CLINICAL      Treatment History:    No known preoperative therapy       SPECIMEN      Procedure:    Enucleation       Tumor Sampling for Molecular Studies:    No       Specimen Laterality:    Left       TUMOR      Tumor Site (macroscopic examination / transillumination):     Inferior quadrant of globe       Tumor Site after Sectioning:    Inferior quadrant of globe       Distance from Anterior Edge of Tumor to Limbus at Cut Edge:    5 mm      Distance from Posterior Margin of Tumor Base to Edge of Optic Disc:    0 mm      Tumor Size after Sectioning:            Greatest Basal Diameter of Tumor:    19 mm        Basal Diameter at Cut Edge of Tumor:    19 mm        Greatest Thickness of Tumor:    8.5 mm        Thickness at Cut Edge of Tumor:    8.5 mm      Tumor Growth Pattern:    Solid mass       Tumor Growth Pattern:    Dome shape       Tumor Size in Microscopic Sections:            Greatest Basal Diameter of Tumor (microscopic):    16.0 mm        Greatest Thickness of Tumor (microscopic):    7.9 mm      Histologic Type:    Mixed cell melanoma (greater than 10% epithelioid cells and less than 90% spindle cells)       Other Ocular Structures Involved by Tumor:    Sclera (within intrascleral emissarial canals)       Other Ocular Structures Involved by Tumor:    Choroid       Other Ocular Structures Involved by Tumor:    Ciliary body       Tumor Location:    Anterior margin between equator and ciliary body       Tumor Location:    Posterior margin between disc and equator       Scleral Involvement:    Intrascleral, within intrascleral emissarial canals       MARGINS      Margin Status:    All margins negative for melanoma       REGIONAL LYMPH NODES      Regional Lymph Node Status:    Not applicable (no regional lymph nodes submitted or found)       PATHOLOGIC STAGE CLASSIFICATION (pTNM, AJCC 8th Edition)      Reporting of pT, pN, and (when applicable) pM categories is based on information available to the pathologist at the time the report is issued. As per the AJCC (Chapter 1, 8th Ed.) it is the managing physician s responsibility to establish the final pathologic stage based upon all pertinent information, including but potentially not limited to this pathology report.      pT Category:    " pT4b       pN Category:    pN not assigned (no nodes submitted or found)       ADDITIONAL FINDINGS      Additional Findings:    Mitotic rate: 12 mitoses per 40 high-power fields (HPF)      Additional Findings:    Degree of pigmentation: Heavy       Additional Findings:    Retinal detachment       Clinical Information       The patient is a 72 year old female with a history of endometrial carcinosarcoma and breast ductal carcinoma who presents with a mass in the left eye, clinically consistent with a uveal melanoma. On ocular echography, the mass is low reflectivity on A-scan and measures 9.94 x 16.94L x 19.72T on B-scan. On exam, the mass is inferior with associated severe exudative retinal detachment and light perception vision. She undergoes enucleation on the left.       Gross Description       A(1). Eye, Left, Left Eye-Melanoma:  In a container labeled \"Cristin Ibarra; 1950; [id number]; Left Eye - Melanoma\" is a soft, slightly collapsed left globe in formalin. It measures 26 mm AP by 26.5 mm horizontally by 25 mm vertically. There is a 4.5 mm length by 4 mm diameter optic nerve attached. The surgical end of the optic nerve is inked green. The sclera is mildly congested. No gross extrascleral extension is identified. The cornea is clear and measures 12 mm horizontally by 11 mm vertically. The anterior chamber is deep. The iris is blue. The pupil is round, and measures 3.5 mm in diameter. The globe transilluminates light well. There is a transillumination shadow inferiorly from 4:30 to 5:00. The shadow measures 20 mm AP by 19 mm transversely. The globe is sectioned in the vertical plane. On sectioning, the angle is open. The anterior chamber is deep. The lens is yellow-translucent. The iris is unremarkable. The pars plana of the ciliary body is involved by tumor inferiorly. The vitreous is clear with 3/5 syneresis. There is a dark brown, dome shaped, subretinal tumor posteriorly. It measures 19 mm in " basal dimension. The height is 8.5 mm at the cut edge, and the apical height is 8.5 mm. The anterior edge of the mass is 5 mm posterior to the limbus, and the posterior edge abuts the optic nerve head. There is a total exudative retinal detachment. The choroid is unremarkable outside the mass. The sclera is unremarkable. No gross intrascleral invasion is seen at the cut edge. The optic disc is obscured by tumor. The central pupil-optic nerve section is submitted in cassette 1. The temporal and nasal calottes are submitted in cassettes 2 and 3 respectively for block only. [facility] September 14, 2023        Microscopic Description       The tissue consists of a globe. The cornea is unremarkable. The angle is mildly narrow but open. The iris is unremarkable. There is mild cortical liquefaction of the lens. There are areas of thickening of the ciliary body basement membrane on PAS stain. A mass is present in the inferior choroid, involving the pars plana of the ciliary body anteriorly. It measures 16.0 mm in basal dimension by 7.9 mm in height. The anterior margin of the tumor is 8.8 mm posterior to the limbus, and the posterior margin is 1.5 mm from the optic nerve head. The mass consists predominantly of spindle B melanoma cells containing cytoplasmic pigment. Areas of cells with epithelioid morphology are seen near the base of the tumor. There are 12 mitotic figures in 40 high power fields. Linear microvessels are seen on PAS stain. Bruchs membrane appears intact overlying the mass. There is a total funnel exudative retinal detachment. Intrascleral invasion is seen along an emissary to canal to approximately 20% scleral thickness. No extrascleral extension is identified in the sections examined. The optic nerve head demonstrates vacuolar appearance of axons consistent with optic disc edema. THe optic nerve sheath is unremarkable.       MCRS Yes (A) N/A    Performing Labs       The technical component of this testing was  completed at [facility]      Case Images         If you have any questions, please call the clinic to make an appointment.    Sincerely,    Javier Mosher MD    Oculoplastic and Orbital Surgery   Department of Ophthalmology and Visual Neurosciences  [facility]

## 2023-09-11 NOTE — BRIEF OP NOTE
[facility]    Brief Operative Note    Pre-operative diagnosis: Choroidal malignant melanoma, left (H) [C69.32]  Post-operative diagnosis Same as pre-operative diagnosis    Procedure: Procedure(s):  Left enucleation with implant  Surgeon: Surgeon(s) and Role:     * Javier Mosher MD - Primary  Anesthesia: General   Estimated Blood Loss: 2 mL from 9/11/2023  2:02 PM to 9/11/2023  3:13 PM      Drains: None  Specimens:   ID Type Source Tests Collected by Time Destination   1 : Left Eye-Melanoma Tissue Eye, Left SURGICAL PATHOLOGY EXAM Javier Mosher MD 9/11/2023  2:33 PM      Findings:   None.  Complications: None  .  Implants:   Implant Name Type Inv. Item Serial No.  Lot No. LRB No. Used Action   EYE IMP SPHERE SILICONE 22MM [id number] - [id number] Lens/Eye Implant EYE IMP SPHERE SILICONE 22MM [id number]  [employer] [id number] Left 1 Implanted

## 2023-09-11 NOTE — OP NOTE
PREOPERATIVE DIAGNOSIS: Choroidal melanoma left eye   POSTOPERATIVE DIAGNOSIS: Choroidal melanoma left  eye   PROCEDURE: Left eye enucleation with placement of a 22 mm silicone implant with the muscles attached. Temporary tarsorrhaphy.   SURGEON: Javier Mosher MD   ASSISTANT: James Hunter MD  ANESTHESIA: General anesthesia.   COMPLICATIONS: None.   SPECIMEN:Left  eye in formalin for pathologic evaluation.   ESTIMATED BLOOD LOSS: Less than 10 mL.   HISTORY AND INDICATIONS: Cristin Ibarra  presented with left choroidal melanoma  left eye. After the risks, benefits and alternatives to the proposed procedure were explained, informed consent was obtained.   DESCRIPTION OF PROCEDURE: Cristin Ibarra  was brought to the operating room and placed supine on the operating table. General anesthesia was induced. The left  eye was prepped and draped in the typical sterile ophthalmic fashion. Attention was directed to the left  side. A Schmidt lid speculum was placed to separate the eyelids. A 360-degree conjunctival peritomy was performed with the vi scissors. Dissection was carried into the quadrants with the Jason tenotomy scissors. Each rectus muscle was hooked with the Augustin muscle hook,  tagged with a double-armed 5-0 Prolene suture and cut free from the globe. The inferior oblique muscle was identified, cauterized and cut free from the globe. The optic nerve was clamped with a hemostat and severed with the enucleation scissors. The hemostasis was obtained with pressure and bipolar cautery. The implant sizer spheres were used to determine the correct implant size and a 22 mm implant was selected. The  implant was placed in the antibiotic solution and then the incisions were made in the posterior portion of the sclera. The implant was placed in the socket using the Wayne sphere introducer. The rectus muscles were sutured to the implant. Tenon's layer was closed with interrupted buried 5-0 Monocryl  sutures. The conjunctiva was closed with running 6-0 plain gut suture. A temporary tarsorrhaphy was fashioned with a 5-0 Monocryl laterally. Antibiotic ointment, a conformer and a pressure patch were placed. The patient tolerated the procedure well and  left the operating room in stable condition after being awoken from general anesthesia.   Javier Mosher MD

## 2023-09-11 NOTE — ANESTHESIA POSTPROCEDURE EVALUATION
Patient: Cristin Ibarra    Procedure: Procedure(s):  Left enucleation with implant       Anesthesia Type:  General    Note:  Disposition: Outpatient   Postop Pain Control: Uneventful            Sign Out: Well controlled pain   PONV: No   Neuro/Psych: Uneventful            Sign Out: Acceptable/Baseline neuro status   Airway/Respiratory: Uneventful            Sign Out: Acceptable/Baseline resp. status   CV/Hemodynamics: Uneventful            Sign Out: Acceptable CV status; No obvious hypovolemia; No obvious fluid overload   Other NRE:    DID A NON-ROUTINE EVENT OCCUR?            Last vitals:  Vitals Value Taken Time   /80 09/11/23 1530   Temp 98  F (36.7  C) 09/11/23 1515   Pulse 61 09/11/23 1530   Resp 18 09/11/23 1530   SpO2 98 % 09/11/23 1530       Electronically Signed By: Brandon Sinclair MD  September 11, 2023  4:10 PM

## 2023-09-11 NOTE — DISCHARGE INSTRUCTIONS
Post-operative Instructions - Enucleation and Evisceration   Ophthalmic Plastic and Reconstructive Surgery    Javier Mosher M.D.    All instructions apply to the operated eye(s) or eyelid(s).    Wound care and personal care  ? Leave the dressing in place until seen in clinic. Ensure that the bandage does not get wet when you take a shower.  ? Warm soaks or warm packs should be used over the operated side four times daily after the dressing has been removed.    ? You may shower the day after surgery but do not get the dressing wet. Do not bathe for 1 week to prevent contamination of your wounds. Do not submerge your head in water (swimming, in a tub) for six weeks.  ? Do not apply make-up to the eyes or eyelids for 2 weeks after surgery.  ? Expect some swelling, bruising, black eye (even into the lower eyelids and cheeks). Also expect serum caking, crusting and discharge from the eye and/or incisions. A small amount of surface bleeding is normal for the first 48 hours.  ? Your eye(s) and eyelid(s) may be painful and tender. This is normal after surgery. You will have pain with eye movements.   Use the pain medication as prescribed.    Follow-up  ? Return to the Eye Clinic for a follow-up appointment with your physician as  scheduled. If no appointment has been scheduled, you should be seen within 4-7 days to have the dressing removed.    Activity restrictions and driving  ? Avoid heavy lifting (over 15 pounds), bending, exercise or strenuous activity for 1 week after surgery.  You may resume other activities and return to work as tolerated.  ? You may not resume driving until have you stopped using narcotic pain medications (such as Norco, Oxycodone, Percocet, Tylenol #3).    Medications  ? Restart all your regular home medications. If you were using eye drops in the operated eye, you can stop those. If you take Plavix or Aspirin on a regular basis, wait for 72 hours after your surgery before restarting these in  order to decrease the risk of bleeding complications.  ? Avoid aspirin and aspirin-like medications (Motrin, Aleve, Ibuprofen, Anay-[place] etc) for 72 hours to reduce the risk of bleeding. You may take Tylenol  (acetaminophen) for pain.  ? In addition to your home medications, take the following post-operative medications as prescribed by your physician.    ? Take pain pills at prescribed frequency as needed for pain.  ? Once your dressing has been removed, you should apply the prescribed antibiotic ointment to the operated eye socket three times a day.    ? WARNING:   ? The prescribed medications may make you drowsy. You must not drive a car, operate heavy machinery or drink alcohol while taking them.  ? The prescribed pain medications may cause constipation and nausea. Take them with some food to prevent a stomach upset. If you continue to experience nausea, call your physician.    Contact Information:  If you have any problems, concerns, or need to schedule follow up please call the clinic:    If your clinic appointments (regardless of where surgery was performed) are at the [facility]: [phone]   If your clinic appointments are at [facility] in [place]: [phone]    An on call person can be reached after hours for concerns. The on call doctor should not call in medication refill requests after hours or on weekends, so please plan accordingly. An effort has been made to provide adequate pain medications following every surgery, and refills will not be provided in most instances.       [facility]  Same-Day Surgery   Adult Discharge Orders & Instructions   For 24 hours after surgery  Get plenty of rest.  A responsible adult must stay with you for at least 24 hours after you leave the hospital.   Do not drive or use heavy equipment.  If you have weakness or tingling, don't drive or use heavy equipment until this feeling goes away.  Do not drink alcohol.  Avoid  strenuous or risky activities.  Ask for help when climbing stairs.   You may feel lightheaded.  IF so, sit for a few minutes before standing.  Have someone help you get up.   If you have nausea (feel sick to your stomach): Drink only clear liquids such as apple juice, ginger ale, broth or 7-Up.  Rest may also help.  Be sure to drink enough fluids.  Move to a regular diet as you feel able.  You may have a slight fever. Call the doctor if your fever is over 100 F (37.7 C) (taken under the tongue) or lasts longer than 24 hours.  You may have a dry mouth, a sore throat, muscle aches or trouble sleeping.  These should go away after 24 hours.  Do not make important or legal decisions.   Call your doctor for any of the followin.  Signs of infection (fever, growing tenderness at the surgery site, a large amount of drainage or bleeding, severe pain, foul-smelling drainage, redness, swelling).    2. It has been over 8 to 10 hours since surgery and you are still not able to urinate (pass water).    3.  Numbness, tingling or weakness the day after surgery (if you had spinal anesthesia).  To contact a doctor, call ___________________________    NO TYLENOL BEFORE 7PM

## 2023-09-11 NOTE — BRIEF OP NOTE
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  Complications: None.  Implants: * No implants in log *

## 2023-09-15 ENCOUNTER — ALLIED HEALTH/NURSE VISIT (OUTPATIENT)
Dept: NURSING | Facility: CLINIC | Age: 73
End: 2023-09-15
Payer: COMMERCIAL

## 2023-09-15 DIAGNOSIS — C69.32 CHOROIDAL MALIGNANT MELANOMA, LEFT (H): Primary | ICD-10-CM

## 2023-09-15 PROCEDURE — 99207 PR NO CHARGE NURSE ONLY: CPT

## 2023-09-15 ASSESSMENT — PAIN SCALES - GENERAL: PAINLEVEL: NO PAIN (0)

## 2023-09-15 NOTE — PROGRESS NOTES
Pt here for removal of left eye patch post enucleation.  Patch is securely taped in place over left eye. Patch removed without difficulty.  Left eye orbit/upper cheek has a small amount of bruising, minimal swelling.  No drainage or unexpected redness noted.  Discussed using erythromycin ointment three times daily and warm packs for bruising.  Postop appointment with Dr Mosher scheduled.  Information for  provided, pt states she will call them to schedule appointment after postop with Dr Mosher.  Pt left clinic independently ambulatory and in good condition, with her .  Chelsey Crane RN

## 2023-09-21 LAB
PATH REPORT.COMMENTS IMP SPEC: ABNORMAL
PATH REPORT.COMMENTS IMP SPEC: ABNORMAL
PATH REPORT.COMMENTS IMP SPEC: YES
PATH REPORT.FINAL DX SPEC: ABNORMAL
PATH REPORT.GROSS SPEC: ABNORMAL
PATH REPORT.MICROSCOPIC SPEC OTHER STN: ABNORMAL
PATH REPORT.RELEVANT HX SPEC: ABNORMAL
PATHOLOGY SYNOPTIC REPORT: ABNORMAL
PHOTO IMAGE: ABNORMAL

## 2023-09-21 PROCEDURE — 88313 SPECIAL STAINS GROUP 2: CPT | Performed by: OPHTHALMOLOGY

## 2023-09-21 PROCEDURE — 88307 TISSUE EXAM BY PATHOLOGIST: CPT | Performed by: OPHTHALMOLOGY

## 2023-09-29 ENCOUNTER — TELEPHONE (OUTPATIENT)
Dept: OPHTHALMOLOGY | Facility: CLINIC | Age: 73
End: 2023-09-29
Payer: COMMERCIAL

## 2023-09-29 NOTE — TELEPHONE ENCOUNTER
Spoke to pt/    Dorzolamide/timolol stinging on application--burns, redness,tearing    No redness around eye noted.    Reviewed with Dr. Amaro    If able to tolerate, okay to continue use until visit 10- with Dr. Díaz    Pt using celluvisc about 2 day.    Reviewed may use refrigerated refresh plus drop 5 minutes before medicated eye drop to see if helps and ok to cool dorzolamide/timolol eye drop in fridge before use.    Pt/son verbally demonstrated understanding and seemed comfortable with plan.    Davis Jones RN 4:01 PM 09/29/23

## 2023-09-29 NOTE — TELEPHONE ENCOUNTER
[facility] Call Center    Phone Message    May a detailed message be left on voicemail: yes     Reason for Call: Medication Question or concern regarding medication   Prescription Clarification  Name of Medication: brimonidine (ALPHAGAN-P) 0.15 % ophthalmic solution     dorzolamide-timolol (COSOPT) 2-0.5 % ophthalmic solution   Prescribing Provider: Sondra   Pharmacy: NA   What on the order needs clarification? Pt  called and said that they have tried to space out her drops differently but they are causing her to have irritation, burning and pain as well as redness. Please contact Fred to discuss    Thank you       Action Taken: Message routed to:  [facility]    Travel Screening: Not Applicable

## 2023-10-04 ENCOUNTER — TELEPHONE (OUTPATIENT)
Dept: OPHTHALMOLOGY | Facility: CLINIC | Age: 73
End: 2023-10-04
Payer: COMMERCIAL

## 2023-10-04 ENCOUNTER — TRANSFERRED RECORDS (OUTPATIENT)
Dept: HEALTH INFORMATION MANAGEMENT | Facility: CLINIC | Age: 73
End: 2023-10-04
Payer: COMMERCIAL

## 2023-10-04 NOTE — TELEPHONE ENCOUNTER
[facility] Call Center    Phone Message    May a detailed message be left on voicemail: yes     Reason for Call: Other: Pt  following up after last message that was left and said that none of pt's symptoms are getting better and they are wondering if she can be seen sooner than her 10/10 appt or if there is anything they can do to relieve the eye pain that she is having, please review and contact pt to discuss     Thank you     Action Taken: Message routed to:  [facility]    Travel Screening: Not Applicable

## 2023-10-04 NOTE — TELEPHONE ENCOUNTER
Spoke to     Today pt went in and saw local eye doctor in [facility] [place]    New dry spot on cornea (likely from eye not closed during procedure last Friday)    New drops started and pt doing better now.    Provided direct number to call after follow up eye exam tomorrow.    If dorzolamide/timolol causes irritation and not able to tolerate-- Ok to change to Timolol 2/day per Dr. Amaro.    Davis Jones RN 4:19 PM 10/04/23

## 2023-10-05 ENCOUNTER — TRANSFERRED RECORDS (OUTPATIENT)
Dept: HEALTH INFORMATION MANAGEMENT | Facility: CLINIC | Age: 73
End: 2023-10-05
Payer: COMMERCIAL

## 2023-10-10 ENCOUNTER — OFFICE VISIT (OUTPATIENT)
Dept: OPHTHALMOLOGY | Facility: CLINIC | Age: 73
End: 2023-10-10
Attending: OPHTHALMOLOGY
Payer: COMMERCIAL

## 2023-10-10 ENCOUNTER — TRANSCRIBE ORDERS (OUTPATIENT)
Dept: OTHER | Age: 73
End: 2023-10-10

## 2023-10-10 DIAGNOSIS — S05.01XA RIGHT CORNEAL ABRASION: ICD-10-CM

## 2023-10-10 DIAGNOSIS — S05.01XA RIGHT CORNEAL ABRASION: Primary | ICD-10-CM

## 2023-10-10 DIAGNOSIS — C69.30 CHOROIDAL MALIGNANT MELANOMA (H): ICD-10-CM

## 2023-10-10 DIAGNOSIS — H18.30 CORNEAL EPITHELIAL DEFECT: Primary | ICD-10-CM

## 2023-10-10 DIAGNOSIS — H40.9: ICD-10-CM

## 2023-10-10 PROCEDURE — 92285 EXTERNAL OCULAR PHOTOGRAPHY: CPT | Mod: 26 | Performed by: OPHTHALMOLOGY

## 2023-10-10 PROCEDURE — 87529 HSV DNA AMP PROBE: CPT | Performed by: STUDENT IN AN ORGANIZED HEALTH CARE EDUCATION/TRAINING PROGRAM

## 2023-10-10 PROCEDURE — G0463 HOSPITAL OUTPT CLINIC VISIT: HCPCS | Performed by: OPHTHALMOLOGY

## 2023-10-10 PROCEDURE — 99214 OFFICE O/P EST MOD 30 MIN: CPT | Mod: 24 | Performed by: OPHTHALMOLOGY

## 2023-10-10 PROCEDURE — 92285 EXTERNAL OCULAR PHOTOGRAPHY: CPT | Performed by: OPHTHALMOLOGY

## 2023-10-10 RX ORDER — OFLOXACIN 3 MG/ML
SOLUTION/ DROPS OPHTHALMIC
COMMUNITY
Start: 2023-10-06 | End: 2023-10-10

## 2023-10-10 RX ORDER — OFLOXACIN 3 MG/ML
SOLUTION/ DROPS OPHTHALMIC
Qty: 5 ML | Refills: 3 | Status: SHIPPED | OUTPATIENT
Start: 2023-10-10 | End: 2024-05-01

## 2023-10-10 RX ORDER — LOTEPREDNOL ETABONATE 5 MG/ML
SUSPENSION/ DROPS OPHTHALMIC
COMMUNITY
Start: 2023-10-06

## 2023-10-10 RX ORDER — TRIFLURIDINE 10 MG/ML
1 SOLUTION OPHTHALMIC
Qty: 7.5 ML | Refills: 3 | Status: SHIPPED | OUTPATIENT
Start: 2023-10-10

## 2023-10-10 RX ORDER — VALACYCLOVIR HYDROCHLORIDE 1 G/1
1000 TABLET, FILM COATED ORAL 3 TIMES DAILY
Qty: 90 TABLET | Refills: 1 | Status: SHIPPED | OUTPATIENT
Start: 2023-10-10

## 2023-10-10 ASSESSMENT — EXTERNAL EXAM - RIGHT EYE: OD_EXAM: NORMAL

## 2023-10-10 ASSESSMENT — VISUAL ACUITY
OD_CC: 20/150
OD_PH_CC: 20/80
METHOD: SNELLEN - LINEAR
CORRECTION_TYPE: GLASSES

## 2023-10-10 ASSESSMENT — REFRACTION_WEARINGRX
OS_AXIS: 053
OS_SPHERE: -1.00
OS_CYLINDER: +0.50
OD_CYLINDER: +0.50
OD_ADD: +2.50
OS_ADD: +2.50
OD_AXIS: 128
SPECS_TYPE: PAL
OD_SPHERE: -1.00

## 2023-10-10 ASSESSMENT — CONF VISUAL FIELD
OD_INFERIOR_TEMPORAL_RESTRICTION: 0
OS_SUPERIOR_TEMPORAL_RESTRICTION: 1
OS_INFERIOR_NASAL_RESTRICTION: 1
OS_SUPERIOR_NASAL_RESTRICTION: 1
OD_INFERIOR_NASAL_RESTRICTION: 0
METHOD: COUNTING FINGERS
OD_SUPERIOR_TEMPORAL_RESTRICTION: 0
OD_NORMAL: 1
OS_INFERIOR_TEMPORAL_RESTRICTION: 1
OD_SUPERIOR_NASAL_RESTRICTION: 0

## 2023-10-10 ASSESSMENT — EXTERNAL EXAM - LEFT EYE: OS_EXAM: NORMAL

## 2023-10-10 ASSESSMENT — SLIT LAMP EXAM - LIDS
COMMENTS: NORMAL
COMMENTS: NORMAL

## 2023-10-10 ASSESSMENT — TONOMETRY
OD_IOP_MMHG: 18
IOP_METHOD: TONOPEN

## 2023-10-10 NOTE — NURSING NOTE
Patient had vocal chord tumor removal.  Chief Complaints and History of Present Illnesses   Patient presents with    New Patient     Chief Complaint(s) and History of Present Illness(es)       New Patient              Laterality: right eye    Associated symptoms: floaters.  Negative for flashes    Treatments tried: eye drops    Pain scale: 5/10              Comments    New cornea appointment.  The patient was scheduled today for Glaucoma follow up.  Glaucoma clinic cannot do testing today so new appointment will be made.  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  During surgery the right eye was scratched and became very painful over the weekend.  She found care at local Optometry clinic.  The Optometrist put an amniotic membrane which was removed yesterday.  She now has a BCL in her right eye.   She is using Lotemax four times daily and Ofloxacin four times a day in the right eye.  She also uses Cosopt twice daily, Brimonidine twice daily and Latanoprost at bedtime in the right eye.    Angle Vernon, COA, COA 12:53 PM 10/10/2023

## 2023-10-10 NOTE — PATIENT INSTRUCTIONS
Stop loteprednol drops  Stop Xalatan drops    Continue Ofloxacin 4 times daily in the right eye  Start Trifluridine (Viroptic) drops 7 times daily in the right eye  Continue your Alphagan and Cosopt as prescribed    Start oral Valtrex Pills 1000mg 3 times daily

## 2023-10-10 NOTE — PROGRESS NOTES
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 She does not remember having eye pain immediately upon awakening because she was on pain meds but stopped Sunday morning as well. She was seen by outside optometrist at [facility] who started her on tobradex QID OD on 10/4/23. Then an Ambiodisk was placed in the right eye on 10/5. Repeat exam on 10/6, no AMT in place at that time. She was switched from tobradex to ofloxacin and loteprednol on 10/6 and repeat ambiodisk was placed that day with BCL. This has made her eye much more comfortable.    No history of cold sores or shingles. Has not had shingles vaccine.    No ocular radiation TX, Apparently no chlorhexadine used in throat surgery. No systemic steroid use. No cold sores. Pt under stress from other surgeries.      Chief Complaint(s) and History of Present Illness(es)       New Patient    In right eye.  Charactertized as  blurred vision.  Associated symptoms include floaters.  Negative for photophobia and flashes.  Treatments tried include eye drops.  Pain was noted as 5/10.             Comments    New cornea appointment.  The patient was scheduled today for Glaucoma follow up.  Glaucoma clinic cannot do testing today due to eye pain and epi defect. Patient to reschedule for glaucoma.   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  During surgery the right eye was scratched and became very painful over the weekend.  She found care at local Optometry clinic.  The Optometrist put an amniotic membrane which was removed yesterday.  She now has a BCL in her right eye.   She is using Lotemax four  times daily and Ofloxacin four times a day in the right eye.  She also uses Cosopt twice daily, Brimonidine twice daily and Latanoprost at bedtime in the right eye.    Angle Vernon, COA, COA 12:53 PM 10/10/2023                     Past ocular history   Prior eye surgery/laser/Trauma: left eye enuc. For melanoma  CTL wearer:No, after trail  Glasses : Yes  Family Hx of eye disease: Brother with glaucoma,    PMH     Past Medical History:   Diagnosis Date    Hyperlipidemia     Irritable bowel syndrome     Malignant neoplasm of female breast (H) 2007    Invasive ductal carcinoma, s/p chemo, radical mastectomy        PSH     Past Surgical History:   Procedure Laterality Date    DAVINCI HYSTERECTOMY TOTAL, BILATERAL SALPINGO-OOPHORECTOMY, COMBINED N/A 1/12/2018    Procedure: COMBINED DAVINCI HYSTERECTOMY TOTAL, SALPINGO-OOPHORECTOMY;  DaVinci Total Laparoscopic Hysterectomy, Bilateral Salpingo-Oophorectomy, Cystoscopy, Paraaortic and bilateral pelvic Lymph Node Dissection, Omental biopsy, episiotomy repair, pelvic washings, Anesthesia Block;  Surgeon: Luis Hayes MD;  Location: [facility] OR    EVISCERATION EYE Left 9/11/2023    Procedure: Left enucleation with implant;  Surgeon: Javier Mosher MD;  Location: [facility] OR    FRACTURE SURGERY      LAPAROSCOPIC HYSTERECTOMY TOTAL, BILATERAL SALPINGO-OOPHORECTOMY, COMBINED  01/12/2018    Robotic TLH, BSO, PPALND, Pelvic washings, minilaparotomy, 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    MASTECTOMY MODIFIED RADICAL  2007    TUBAL LIGATION         Meds     Current Outpatient Medications   Medication    acetaminophen (TYLENOL) 325 MG tablet    brimonidine (ALPHAGAN-P) 0.15 % ophthalmic solution    dorzolamide-timolol (COSOPT) 2-0.5 % ophthalmic solution    erythromycin (ROMYCIN) 5 MG/GM ophthalmic ointment    latanoprost (XALATAN) 0.005 % ophthalmic solution    loteprednol (LOTEMAX) 0.5 % ophthalmic suspension    Multiple Vitamins-Minerals (MULTIVITAMIN PO)    ofloxacin (OCUFLOX)  0.3 % ophthalmic solution    omeprazole (PRILOSEC) 40 MG DR capsule    ascorbic acid (RA VITAMIN C/MAJO HIPS) 1000 MG TABS tablet    Calcium Carb-Cholecalciferol (CALCIUM/VITAMIN D PO)    ibuprofen (ADVIL/MOTRIN) 600 MG tablet    senna-docusate (SENOKOT-S;PERICOLACE) 8.6-50 MG per tablet    Varenicline Tartrate (CHANTIX PO)     No current facility-administered medications for this visit.       Labs   -    Imaging   -    Drops Currently Taking   Ofloxacin QID OD  Loteprednol QID OD  Xalatan at bedtime OD  Alphagan BID OD  Cosopt BID OD    Assessment/Plan     # Epithelial defect, right eye  - Had Ambiodisk placed x2 by outside optometrist  - Epi defect concerning with dendritic appearance, would be concerned about HSV.  - Consider - right eye HSV infection: stop lotaprednol, Stop xalatan - if OK with glaucoma  - Start viroptic right eye 7 / day.  - Start Valtrex PO 1000mg tid.  - SLP photos today 10/10  - HSV culture done today 10/10  - continue ABX    # History of choroidal malignant melanoma, left eye  # S/p enucleation left eye 9/11/23  - Follows with Dr. Amaro    # POAG, OD  - To see Dr. Díaz today  - Drops per glaucoma      Follow up: 2 days - 1 week.  Oph:      Katia Irwin MD  Resident Physician, PGY-3  Department of Ophthalmology    I personally reviewed the ophthalmic test(s) associated with this encounter, agree with the interpretation(s) as documented by the resident/fellow, and have edited the corresponding report(s) as necessary. Kike Fong MD     Attending Physician Attestation:  Complete documentation of historical and exam elements from today's encounter can be found in the full encounter summary report (not reduplicated in this progress note).  I personally obtained the chief complaint(s) and history of present illness.  I confirmed and edited as necessary the review of systems, past medical/surgical history, family history, social history, and examination findings as documented  by others; and I examined the patient myself.  I personally reviewed the relevant tests, images, and reports as documented above.  I formulated and edited as necessary the assessment and plan and discussed the findings and management plan with the patient and family. - Kike Fong MD

## 2023-10-10 NOTE — LETTER
10/10/2023       RE: Cristin Ibarra  [address] [place] [address]     Dear Colleague,    Thank you for referring your patient, Cristin Ibarra, to the [facility] [place] at [facility]. Please see a copy of my visit note below.    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 She does not remember having eye pain immediately upon awakening because she was on pain meds but stopped Sunday morning as well. She was seen by outside optometrist at [facility] who started her on tobradex QID OD on 10/4/23. Then an Ambiodisk was placed in the right eye on 10/5. Repeat exam on 10/6, no AMT in place at that time. She was switched from tobradex to ofloxacin and loteprednol on 10/6 and repeat ambiodisk was placed that day with BCL. This has made her eye much more comfortable.    No history of cold sores or shingles. Has not had shingles vaccine.    No ocular radiation TX, Apparently no chlorhexadine used in throat surgery. No systemic steroid use. No cold sores. Pt under stress from other surgeries.      Chief Complaint(s) and History of Present Illness(es)       New Patient    In right eye.  Charactertized as  blurred vision.  Associated symptoms include floaters.  Negative for photophobia and flashes.  Treatments tried include eye drops.  Pain was noted as 5/10.             Comments    New cornea appointment.  The patient was scheduled today for Glaucoma follow up.  Glaucoma clinic cannot do testing today due to eye pain and epi defect. Patient to reschedule for glaucoma.   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  During surgery the right eye was scratched and became very painful over the weekend.  She found care at local Optometry clinic.  The Optometrist put an amniotic membrane which was removed yesterday.  She now has a BCL in her right eye.   She is using Lotemax four times daily and Ofloxacin four times a day in the right eye.  She also uses Cosopt twice daily, Brimonidine twice daily and Latanoprost at bedtime in the right eye.    Angle Vernon, COA, COA 12:53 PM 10/10/2023                     Past ocular history   Prior eye surgery/laser/Trauma: left eye enuc. For melanoma  CTL wearer:No, after trail  Glasses : Yes  Family Hx of eye disease: Brother with glaucoma,    PMH     Past Medical History:   Diagnosis Date    Hyperlipidemia     Irritable bowel syndrome     Malignant neoplasm of female breast (H) 2007    Invasive ductal carcinoma, s/p chemo, radical mastectomy        PSH     Past Surgical History:   Procedure Laterality Date    DAVINCI HYSTERECTOMY TOTAL, BILATERAL SALPINGO-OOPHORECTOMY, COMBINED N/A 1/12/2018    Procedure: COMBINED DAVINCI HYSTERECTOMY TOTAL, SALPINGO-OOPHORECTOMY;  DaVinci Total Laparoscopic Hysterectomy, Bilateral Salpingo-Oophorectomy, Cystoscopy, Paraaortic and bilateral pelvic Lymph Node Dissection, Omental biopsy, episiotomy repair, pelvic washings, Anesthesia Block;  Surgeon: Luis Hayes MD;  Location: [facility] OR    EVISCERATION EYE Left 9/11/2023    Procedure: Left enucleation with implant;  Surgeon: Javier Mosher MD;  Location: [facility] OR    FRACTURE SURGERY      LAPAROSCOPIC HYSTERECTOMY TOTAL, BILATERAL SALPINGO-OOPHORECTOMY, COMBINED  01/12/2018    Robotic TLH, BSO, PPALND, Pelvic washings, minilaparotomy, 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    MASTECTOMY MODIFIED RADICAL  2007    TUBAL LIGATION         Meds     Current Outpatient Medications   Medication    acetaminophen (TYLENOL) 325 MG tablet    brimonidine (ALPHAGAN-P)  0.15 % ophthalmic solution    dorzolamide-timolol (COSOPT) 2-0.5 % ophthalmic solution    erythromycin (ROMYCIN) 5 MG/GM ophthalmic ointment    latanoprost (XALATAN) 0.005 % ophthalmic solution    loteprednol (LOTEMAX) 0.5 % ophthalmic suspension    Multiple Vitamins-Minerals (MULTIVITAMIN PO)    ofloxacin (OCUFLOX) 0.3 % ophthalmic solution    omeprazole (PRILOSEC) 40 MG DR capsule    ascorbic acid (RA VITAMIN C/MAJO HIPS) 1000 MG TABS tablet    Calcium Carb-Cholecalciferol (CALCIUM/VITAMIN D PO)    ibuprofen (ADVIL/MOTRIN) 600 MG tablet    senna-docusate (SENOKOT-S;PERICOLACE) 8.6-50 MG per tablet    Varenicline Tartrate (CHANTIX PO)     No current facility-administered medications for this visit.       Labs   -    Imaging   -    Drops Currently Taking   Ofloxacin QID OD  Loteprednol QID OD  Xalatan at bedtime OD  Alphagan BID OD  Cosopt BID OD    Assessment/Plan     # Epithelial defect, right eye  - Had Ambiodisk placed x2 by outside optometrist  - Epi defect concerning with dendritic appearance, would be concerned about HSV.  - Consider - right eye HSV infection: stop lotaprednol, Stop xalatan - if OK with glaucoma  - Start viroptic right eye 7 / day.  - Start Valtrex PO 1000mg tid.  - SLP photos today 10/10  - HSV culture done today 10/10    # History of choroidal malignant melanoma, left eye  # S/p enucleation left eye 9/11/23  - Follows with Dr. Amaro    # POAG, OD  - To see Dr. Díaz today  - Drops per glaucoma      Follow up: 1 week.  Oph:      Again, thank you for allowing me to participate in the care of your patient.      Sincerely,    Kike Fong MD

## 2023-10-11 LAB
HSV1 DNA SPEC QL NAA+PROBE: NOT DETECTED
HSV2 DNA SPEC QL NAA+PROBE: NOT DETECTED

## 2023-10-18 ENCOUNTER — TELEPHONE (OUTPATIENT)
Dept: OPHTHALMOLOGY | Facility: CLINIC | Age: 73
End: 2023-10-18
Payer: COMMERCIAL

## 2023-10-18 NOTE — TELEPHONE ENCOUNTER
[facility]    Phone Message    May a detailed message be left on voicemail: yes     Reason for Call: Requesting Results     Name/type of test: lab test   Date of test: 10/10  Was test done at a location other than [facility] (Please fill in the location if not [facility])?: Yes: spouse is wondering what Cristin's test results were from last week. Please call to advise.     Action Taken: Other: eye    Travel Screening: Not Applicable

## 2023-10-18 NOTE — TELEPHONE ENCOUNTER
Called and spoke to       was upset about waiting for the results of the test until tomorrow     Per Sheng they dont want to drive hrs for Dr. Fong to say Yes she has the virus - they want to know now.     Sheng wants someone to call him today about the results     Malissa Holliday Communication Facilitator on 10/18/2023 at 1:43 PM

## 2023-10-19 ENCOUNTER — OFFICE VISIT (OUTPATIENT)
Dept: OPHTHALMOLOGY | Facility: CLINIC | Age: 73
End: 2023-10-19
Attending: OPHTHALMOLOGY
Payer: COMMERCIAL

## 2023-10-19 DIAGNOSIS — H16.9 KERATITIS, RIGHT: ICD-10-CM

## 2023-10-19 DIAGNOSIS — H18.30 CORNEAL EPITHELIAL DEFECT: Primary | ICD-10-CM

## 2023-10-19 PROCEDURE — 99214 OFFICE O/P EST MOD 30 MIN: CPT | Mod: 24 | Performed by: OPHTHALMOLOGY

## 2023-10-19 PROCEDURE — G0463 HOSPITAL OUTPT CLINIC VISIT: HCPCS | Performed by: OPHTHALMOLOGY

## 2023-10-19 ASSESSMENT — REFRACTION_WEARINGRX
OD_SPHERE: -1.00
OS_CYLINDER: +0.50
OD_CYLINDER: +0.50
SPECS_TYPE: PAL
OD_AXIS: 128
OS_SPHERE: -1.00
OS_ADD: +2.50
OD_ADD: +2.50
OS_AXIS: 053

## 2023-10-19 ASSESSMENT — EXTERNAL EXAM - LEFT EYE: OS_EXAM: NORMAL

## 2023-10-19 ASSESSMENT — SLIT LAMP EXAM - LIDS
COMMENTS: NORMAL
COMMENTS: NORMAL

## 2023-10-19 ASSESSMENT — VISUAL ACUITY
METHOD: SNELLEN - LINEAR
OD_CC: 20/60
CORRECTION_TYPE: GLASSES
OD_CC+: -2
OS_CC: NLP

## 2023-10-19 ASSESSMENT — EXTERNAL EXAM - RIGHT EYE: OD_EXAM: NORMAL

## 2023-10-19 ASSESSMENT — TONOMETRY
OD_IOP_MMHG: 25
OS_IOP_MMHG: XX
IOP_METHOD: ICARE

## 2023-10-19 NOTE — PROGRESS NOTES
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 She does not remember having eye pain immediately upon awakening because she was on pain meds but stopped Sunday morning as well. She was seen by outside optometrist at [facility] who started her on tobradex QID OD on 10/4/23. Then an Ambiodisk was placed in the right eye on 10/5. Repeat exam on 10/6, no AMT in place at that time. She was switched from tobradex to ofloxacin and loteprednol on 10/6 and repeat ambiodisk was placed that day with BCL. This has made her eye much more comfortable.    No history of cold sores or shingles. Has not had shingles vaccine.    No ocular radiation TX, Apparently no chlorhexadine used in throat surgery. No systemic steroid use. No cold sores. Pt under stress from other surgeries.    Interval history (10/19/23): Here for follow up of epi defect in the right eye. Eye is feeling a little sore still but not bad.      Past ocular history   Prior eye surgery/laser/Trauma: left eye enuc. For melanoma  CTL wearer:No, after trail  Glasses : Yes  Family Hx of eye disease: Brother with glaucoma,    PMH     Past Medical History:   Diagnosis Date    Hyperlipidemia     Irritable bowel syndrome     Malignant neoplasm of female breast (H) 2007    Invasive ductal carcinoma, s/p chemo, radical mastectomy        PS     Past Surgical History:   Procedure Laterality Date    DAVINCI HYSTERECTOMY TOTAL, BILATERAL SALPINGO-OOPHORECTOMY, COMBINED N/A 1/12/2018    Procedure: COMBINED DAVINCI HYSTERECTOMY TOTAL, SALPINGO-OOPHORECTOMY;  DaVinci Total Laparoscopic Hysterectomy, Bilateral Salpingo-Oophorectomy, Cystoscopy,  Paraaortic and bilateral pelvic Lymph Node Dissection, Omental biopsy, episiotomy repair, pelvic washings, Anesthesia Block;  Surgeon: Luis Hayes MD;  Location: [facility] OR    EVISCERATION EYE Left 9/11/2023    Procedure: Left enucleation with implant;  Surgeon: Javier Mosher MD;  Location: [facility] OR    FRACTURE SURGERY      LAPAROSCOPIC HYSTERECTOMY TOTAL, BILATERAL SALPINGO-OOPHORECTOMY, COMBINED  01/12/2018    Robotic TLH, BSO, PPALND, Pelvic washings, minilaparotomy, 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    MASTECTOMY MODIFIED RADICAL  2007    TUBAL LIGATION         Meds     Current Outpatient Medications   Medication    acetaminophen (TYLENOL) 325 MG tablet    ascorbic acid (RA VITAMIN C/MAJO HIPS) 1000 MG TABS tablet    brimonidine (ALPHAGAN-P) 0.15 % ophthalmic solution    Calcium Carb-Cholecalciferol (CALCIUM/VITAMIN D PO)    dorzolamide-timolol (COSOPT) 2-0.5 % ophthalmic solution    erythromycin (ROMYCIN) 5 MG/GM ophthalmic ointment    ibuprofen (ADVIL/MOTRIN) 600 MG tablet    latanoprost (XALATAN) 0.005 % ophthalmic solution    loteprednol (LOTEMAX) 0.5 % ophthalmic suspension    Multiple Vitamins-Minerals (MULTIVITAMIN PO)    ofloxacin (OCUFLOX) 0.3 % ophthalmic solution    omeprazole (PRILOSEC) 40 MG DR capsule    senna-docusate (SENOKOT-S;PERICOLACE) 8.6-50 MG per tablet    trifluridine (VIROPTIC) 1 % ophthalmic solution    valACYclovir (VALTREX) 1000 mg tablet    Varenicline Tartrate (CHANTIX PO)     No current facility-administered medications for this visit.       Labs   - HSV culture done on 10/10 negative-    Imaging   -    Drops Currently Taking   Ofloxacin QID OD  viroptic right eye 7 / day.  Alphagan BID OD  Cosopt BID OD    Assessment/Plan     # Epithelial defect, right eye  - Had Ambiodisk placed x2 by outside optometrist  - Epi defect concerning with dendritic appearance, would be concerned about HSV.  - Consider - right eye HSV infection: stop lotaprednol, Stop xalatan - if OK with  glaucoma  - HSV culture done on 10/10 negative  - 10/19/23: Epi defect healed with residual area of diffuse PEE, BCL removed today.  - reduce viroptic right eye 4/ day. With very slow taper over 3 months  - continue Valtrex PO 1000mg tid.  -  continue Ofloxacin QID - for 2 weeks.    # History of choroidal malignant melanoma, left eye  # S/p enucleation left eye 9/11/23  - Follows with Dr. Amaro    # POAG, OD  - To see Dr. Díaz today  - Drops per glaucoma      Follow up: 1 month      Katia Irwin MD  Resident Physician, PGY-3  Department of Ophthalmology    Attending Physician Attestation:  Complete documentation of historical and exam elements from today's encounter can be found in the full encounter summary report (not reduplicated in this progress note).  I personally obtained the chief complaint(s) and history of present illness.  I confirmed and edited as necessary the review of systems, past medical/surgical history, family history, social history, and examination findings as documented by others; and I examined the patient myself.  I personally reviewed the relevant tests, images, and reports as documented above.  I formulated and edited as necessary the assessment and plan and discussed the findings and management plan with the patient and family. - Kike Fong MD

## 2023-10-19 NOTE — NURSING NOTE
Chief Complaints and History of Present Illnesses   Patient presents with    Follow Up     Chief Complaint(s) and History of Present Illness(es)       Follow Up              Laterality: both eyes    Associated symptoms: eye pain and floaters.  Negative for flashes    Treatments tried: eye drops              Comments    Here for follow up. VA has slightly improved. Minimal pain. Stable floaters without flashes. Compliant with drops.    Jesus Isaura COT 1:46 PM October 19, 2023

## 2023-10-25 ENCOUNTER — OFFICE VISIT (OUTPATIENT)
Dept: OPHTHALMOLOGY | Facility: CLINIC | Age: 73
End: 2023-10-25
Payer: COMMERCIAL

## 2023-10-25 DIAGNOSIS — C69.32 CHOROIDAL MALIGNANT MELANOMA, LEFT (H): Primary | ICD-10-CM

## 2023-10-25 PROCEDURE — 99024 POSTOP FOLLOW-UP VISIT: CPT | Performed by: OPHTHALMOLOGY

## 2023-10-25 ASSESSMENT — VISUAL ACUITY
OS_CC: XX
METHOD: SNELLEN - LINEAR
CORRECTION_TYPE: GLASSES
OD_CC+: +2
OD_CC: 20/50

## 2023-10-25 ASSESSMENT — TONOMETRY
IOP_METHOD: TONOPEN
OS_IOP_MMHG: XX
OD_IOP_MMHG: 14

## 2023-10-25 NOTE — NURSING NOTE
Chief Complaints and History of Present Illnesses   Patient presents with    Post Op (Ophthalmology) Left Eye       Chief Complaint(s) and History of Present Illness(es)       Post Op (Ophthalmology) Left Eye              Laterality: left eye              Comments    S/P Left eye enucleation with placement of a 22 mm silicone implant with the muscles attached, Temporary tarsorrhaphy,  09/11/2023.   Patient has no concerns for today, states healing is going well.                      Kapil Edward, Ophthalmic Assistant

## 2023-10-25 NOTE — PROGRESS NOTES
Chief Complaint(s) and History of Present Illness(es)     Post Op (Ophthalmology) Left Eye            Laterality: left eye          Comments    S/P Left eye enucleation with placement of a 22 mm silicone implant with   the muscles attached, Temporary tarsorrhaphy,  09/11/2023.   Patient has no concerns for today, states healing is going well.        Final Diagnosis   A. Eye, left, enucleation:  1. 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  - No evidence of extrascleral extension is identified in the sections examined.  2. Total exudative retinal detachment.  3. Optic disc edema.  4. Mild cortical cataract.      Cristin Ibarra is about 2 months status post op.  She is doing well. Conjunctiva is healthy with good motility.   [place] pending.   She is currently dealing with right eye, developed what sounds like an epi defect postoperatively following throat surgery. There was concern about it being herpetic. Cultures were negative.     She will follow up with me in six months.  Complete documentation of historical and exam elements from today's encounter can be found in the full encounter summary report (not reduplicated in this progress note). I personally obtained the chief complaint(s) and history of present illness.  I confirmed and edited as necessary the review of systems, past medical/surgical history, family history, social history, and examination findings as documented by others; and I examined the patient myself. I personally reviewed the relevant tests, images, and reports as documented above. I formulated and edited as necessary the  assessment and plan and discussed the findings and management plan with the patient and family. - Javier Msoher MD

## 2023-10-31 ENCOUNTER — LAB REQUISITION (OUTPATIENT)
Dept: LAB | Facility: CLINIC | Age: 73
End: 2023-10-31
Payer: COMMERCIAL

## 2023-11-13 ENCOUNTER — TRANSFERRED RECORDS (OUTPATIENT)
Dept: HEALTH INFORMATION MANAGEMENT | Facility: CLINIC | Age: 73
End: 2023-11-13
Payer: COMMERCIAL

## 2023-11-15 LAB
BKR LAB AP ADD'L TEST STATUS: NORMAL
BKR PATH ADDL TEST FINAL COMMENTS: NORMAL

## 2023-12-17 ENCOUNTER — HEALTH MAINTENANCE LETTER (OUTPATIENT)
Age: 73
End: 2023-12-17

## 2024-05-01 ENCOUNTER — OFFICE VISIT (OUTPATIENT)
Dept: OPHTHALMOLOGY | Facility: CLINIC | Age: 74
End: 2024-05-01
Payer: COMMERCIAL

## 2024-05-01 DIAGNOSIS — C69.32 CHOROIDAL MALIGNANT MELANOMA, LEFT (H): Primary | ICD-10-CM

## 2024-05-01 PROCEDURE — 99213 OFFICE O/P EST LOW 20 MIN: CPT | Performed by: OPHTHALMOLOGY

## 2024-05-01 ASSESSMENT — CONF VISUAL FIELD
OD_INFERIOR_NASAL_RESTRICTION: 0
OS_SUPERIOR_TEMPORAL_RESTRICTION: 1
OS_INFERIOR_TEMPORAL_RESTRICTION: 1
OD_INFERIOR_TEMPORAL_RESTRICTION: 0
OS_SUPERIOR_NASAL_RESTRICTION: 1
OD_SUPERIOR_TEMPORAL_RESTRICTION: 0
OD_NORMAL: 1
OD_SUPERIOR_NASAL_RESTRICTION: 0
OS_INFERIOR_NASAL_RESTRICTION: 1

## 2024-05-01 ASSESSMENT — EXTERNAL EXAM - RIGHT EYE: OD_EXAM: NORMAL

## 2024-05-01 ASSESSMENT — VISUAL ACUITY
OD_CC: 20/50
METHOD: SNELLEN - LINEAR
CORRECTION_TYPE: GLASSES

## 2024-05-01 ASSESSMENT — EXTERNAL EXAM - LEFT EYE: OS_EXAM: NORMAL

## 2024-05-01 ASSESSMENT — TONOMETRY
OD_IOP_MMHG: 19
IOP_METHOD: ICARE

## 2024-05-01 ASSESSMENT — SLIT LAMP EXAM - LIDS
COMMENTS: NORMAL
COMMENTS: NORMAL

## 2024-05-01 NOTE — PROGRESS NOTES
Chief Complaint(s) and History of Present Illness(es)     Enucleation follow-up           Comments    Pt returns for 6 month follow-up, S/P left eye enucleation with placement   of a 22 mm silicone implant with muscles attached. Pt states that overall   the comfort has been good, but she does occasionally have the sensation   that the patch is still on the left side of her face. She continues on the   artificial tears left side and Cosopt igtt right eye BID.          ONCOLOGICAL CHRONOLOGICAL HISTORY:  Left ciliochoroid melanoma:  Bilateral laryngeal squamous cell carcinoma:  July 13, 2023: Presents to ophthalmologist with 2 to 3-month history of left eye blurry vision with exam raising concern for left eye melanoma. ENT consultation same day for 1 year of progressive hoarseness with normal laryngoscopy and felt clinically to be related to reflux and initiated on proton pump inhibitor.  July 27, 2023: Ophthalmology and retinal specialist consultation with exam suspicious for choroidal melanoma of the left eye with serious retinal detachment secondary to the melanoma.  August 2, 2023: CT chest/abdomen/pelvis with IV contrast negative for metastatic disease.  August 11, 2023: MRI head shows posterior left lobe mass measuring 2.1 x 0.9 x 1.2 cm but is otherwise negative; no intracranial metastases noted.  August 15, 2023: PET scan shows hypermetabolic activity in left paracentral region of the anterior larynx. No distant metastatic disease noted. CBC, CMP, LDH normal with the exception of .   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 No evidence of extrascleral extension is identified in the sections examined.   September 29, 2023: Bilateral vocal cord biopsies both show invasive squamous cell carcinoma.  November 1 - December 12, 2023: Definitive laryngeal radiation, 63 Gray, 29 fractions, no systemic therapy given.  March 8, 2024: CT neck with contrast negative for evidence of recurrent head neck malignancy.  April 22, 2024: Whole-body PET scan negative for evidence of recurrent melanoma or other malignancy.       Assessment & Plan     Cristin Ibarra is a 73 year old female with the following diagnoses:   Encounter Diagnosis   Name Primary?    Choroidal malignant melanoma, left (H) Yes       Path:   Final Diagnosis   A. Eye, left, enucleation:  1. 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  - No evidence of extrascleral extension is identified in the sections examined.  2. Total exudative retinal detachment.  3. Optic disc edema.  4. Mild cortical cataract.     Class 2 Prame negative    Healthy anophthlamic socket.  Seeing local eye doctor, discussed monocular precautions.      once a year.     Follow-up with me in 1 year.      Attending Physician Attestation: Complete documentation of historical and exam elements from today's encounter can be found in the full encounter summary report (not reduplicated in this progress note). I personally obtained the chief complaint(s) and history of present illness. I confirmed and edited as necessary the review of systems, past medical/surgical history, family history, social history,  and examination findings as documented by others; and I examined the patient myself. I personally reviewed the relevant tests, images, and reports as documented above. I formulated and edited as necessary the assessment and plan and discussed the findings and management plan with the patient.  -Javier Mosher MD

## 2025-01-12 ENCOUNTER — HEALTH MAINTENANCE LETTER (OUTPATIENT)
Age: 75
End: 2025-01-12

## (undated) DEVICE — TUBING SUCTION 10'X3/16" N510

## (undated) DEVICE — PROTECTOR ARM ONE-STEP TRENDELENBURG 40418

## (undated) DEVICE — SOL WATER IRRIG 1000ML BOTTLE 2F7114

## (undated) DEVICE — SYR 03ML LL W/O NDL

## (undated) DEVICE — SU VICRYL 0 UR-6 27" J603H

## (undated) DEVICE — SU VICRYL 0 CT-1 36" J346H

## (undated) DEVICE — GLOVE PROTEXIS POWDER FREE 7.5 ORTHOPEDIC 2D73ET75

## (undated) DEVICE — SOL ADH LIQUID BENZOIN SWAB 0.6ML C1544

## (undated) DEVICE — SU VICRYL 0 TIE 54" J608H

## (undated) DEVICE — TUBING CONMED AIRSEAL SMOKE EVAC INSUFFLATION ASM-EVAC

## (undated) DEVICE — NDL 30GA 0.5" 305106

## (undated) DEVICE — EYE DRSG PAD OVAL

## (undated) DEVICE — SYSTEM CLEARIFY VISUALIZATION 21-345

## (undated) DEVICE — ENDO TROCAR CONMED AIRSEAL BLADELESS 12X120MM IAS12-120LP

## (undated) DEVICE — DAVINCI XI GRASPER ENDOWRIST PROGRASP 470093

## (undated) DEVICE — KIT PATIENT POSITIONING PIGAZZI LATEX FREE 40580

## (undated) DEVICE — DRSG PRIMAPORE 02X3" 7133

## (undated) DEVICE — DAVINCI XI VESSEL SEALER 480322

## (undated) DEVICE — GLOVE BIOGEL PI MICRO SZ 7.5 48575

## (undated) DEVICE — LINEN TOWEL PACK X6 WHITE 5487

## (undated) DEVICE — DRAPE MAYO STAND 23X54 8337

## (undated) DEVICE — SU PROLENE 5-0 RB-2DA 18" 8713H

## (undated) DEVICE — SU PLAIN 6-0 G-1DA 18" 770G

## (undated) DEVICE — SU MONOCRYL 4-0 PS-2 27" UND Y426H

## (undated) DEVICE — DAVINCI XI DRAPE COLUMN 470341

## (undated) DEVICE — DAVINCI HOT SHEARS TIP COVER  400180

## (undated) DEVICE — EYE CONFORMER MED S6.2231U

## (undated) DEVICE — SPECIMEN TRAP MUCOUS 40ML LUKI C30200A

## (undated) DEVICE — RETR ELEV / UTERINE MANIPULATOR V-CARE MED CUP 60-6085-201A

## (undated) DEVICE — DAVINCI XI OBTURATOR BLADELESS 8MM 470359

## (undated) DEVICE — DRSG EYE PAD STERILE 1.63X2.63" NON21600

## (undated) DEVICE — TUBING IRRIG CYSTO/BLADDER SET 81" LF 2C4040

## (undated) DEVICE — PACK MINOR EYE

## (undated) DEVICE — Device

## (undated) DEVICE — PREP CHLORAPREP 26ML TINTED ORANGE  260815

## (undated) DEVICE — SOL NACL 0.9% INJ 1000ML BAG 07983-09

## (undated) DEVICE — DAVINCI XI DRAPE ARM 470015

## (undated) DEVICE — STPL SKIN 35W 059037

## (undated) DEVICE — ENDO RETRACTOR PADDLE 12MM  173046

## (undated) DEVICE — LINEN TOWEL PACK X30 5481

## (undated) DEVICE — ESU GROUND PAD ADULT REM W/15' CORD E7507DB

## (undated) DEVICE — DAVINCI XI SEAL UNIVERSAL 5-8MM 470361

## (undated) DEVICE — SU VICRYL 2-0 CT-2 27" J333H

## (undated) DEVICE — WIPES FOLEY CARE SURESTEP PROVON DFC100

## (undated) DEVICE — DAVINCI XI MONOPOLAR SCISSORS HOT SHEARS 8MM 470179

## (undated) DEVICE — BAG SPECIMEN NYLON MEMORY WIRE 7.5"X9" SB1079

## (undated) DEVICE — SPONGE LAP 18X18" X8435

## (undated) DEVICE — DEVICE SUTURE GRASPER TROCAR CLOSURE 14GA PMITCSG

## (undated) DEVICE — SUCTION IRR STRYKERFLOW II W/TIP 250-070-520

## (undated) DEVICE — SU MONOCRYL 5-0 P-3 18" UND Y493G

## (undated) DEVICE — PACK GOWN 3/PK DISP XL SBA32GPFCB

## (undated) DEVICE — DRSG XEROFORM 1X8"

## (undated) DEVICE — SOL WATER IRRIG 1000ML BOTTLE 07139-09

## (undated) DEVICE — ESU PENCIL W/COATED BLADE E2450H

## (undated) DEVICE — KOH COLPOTOMIZER OCCLUDER  CPO-6

## (undated) DEVICE — DRAPE SHEET REV FOLD 3/4 9349

## (undated) DEVICE — PREP TECHNI-CARE CHLOROXYLENOL 3% 4OZ BOTTLE C222-4ZWO

## (undated) DEVICE — DAVINCI XI NDL DRIVER MEGA SUTURE CUT 8MM 470309

## (undated) DEVICE — SUCTION MANIFOLD DORNOCH ULTRA CART UL-CL500

## (undated) DEVICE — NDL INSUFFLATION 120MM VERRES 172015

## (undated) DEVICE — SU WND CLOSURE VLOC 90 ABS 2-0 UND 9" GS-22 VLOCM2245

## (undated) RX ORDER — CELECOXIB 50 MG/1
CAPSULE ORAL
Status: DISPENSED
Start: 2018-01-12

## (undated) RX ORDER — FENTANYL CITRATE 50 UG/ML
INJECTION, SOLUTION INTRAMUSCULAR; INTRAVENOUS
Status: DISPENSED
Start: 2023-09-11

## (undated) RX ORDER — ACETAMINOPHEN 325 MG/1
TABLET ORAL
Status: DISPENSED
Start: 2023-09-11

## (undated) RX ORDER — PROPOFOL 10 MG/ML
INJECTION, EMULSION INTRAVENOUS
Status: DISPENSED
Start: 2018-01-12

## (undated) RX ORDER — GLYCOPYRROLATE 0.2 MG/ML
INJECTION, SOLUTION INTRAMUSCULAR; INTRAVENOUS
Status: DISPENSED
Start: 2023-09-11

## (undated) RX ORDER — CEFAZOLIN SODIUM 1 G/3ML
INJECTION, POWDER, FOR SOLUTION INTRAMUSCULAR; INTRAVENOUS
Status: DISPENSED
Start: 2018-01-12

## (undated) RX ORDER — CEFAZOLIN SODIUM 2 G/100ML
INJECTION, SOLUTION INTRAVENOUS
Status: DISPENSED
Start: 2018-01-12

## (undated) RX ORDER — ONDANSETRON 2 MG/ML
INJECTION INTRAMUSCULAR; INTRAVENOUS
Status: DISPENSED
Start: 2023-09-11

## (undated) RX ORDER — FENTANYL CITRATE 50 UG/ML
INJECTION, SOLUTION INTRAMUSCULAR; INTRAVENOUS
Status: DISPENSED
Start: 2018-01-12

## (undated) RX ORDER — PHENYLEPHRINE HCL IN 0.9% NACL 1 MG/10 ML
SYRINGE (ML) INTRAVENOUS
Status: DISPENSED
Start: 2018-01-12

## (undated) RX ORDER — PROPOFOL 10 MG/ML
INJECTION, EMULSION INTRAVENOUS
Status: DISPENSED
Start: 2023-09-11

## (undated) RX ORDER — HEPARIN SODIUM 5000 [USP'U]/.5ML
INJECTION, SOLUTION INTRAVENOUS; SUBCUTANEOUS
Status: DISPENSED
Start: 2018-01-12

## (undated) RX ORDER — KETOROLAC TROMETHAMINE 30 MG/ML
INJECTION, SOLUTION INTRAMUSCULAR; INTRAVENOUS
Status: DISPENSED
Start: 2018-01-12

## (undated) RX ORDER — LIDOCAINE HYDROCHLORIDE 20 MG/ML
INJECTION, SOLUTION EPIDURAL; INFILTRATION; INTRACAUDAL; PERINEURAL
Status: DISPENSED
Start: 2018-01-12

## (undated) RX ORDER — OXYCODONE HYDROCHLORIDE 5 MG/1
TABLET ORAL
Status: DISPENSED
Start: 2018-01-12

## (undated) RX ORDER — ACETAMINOPHEN 325 MG/1
TABLET ORAL
Status: DISPENSED
Start: 2018-01-12

## (undated) RX ORDER — EPHEDRINE SULFATE 50 MG/ML
INJECTION, SOLUTION INTRAMUSCULAR; INTRAVENOUS; SUBCUTANEOUS
Status: DISPENSED
Start: 2018-01-12

## (undated) RX ORDER — PHENAZOPYRIDINE HYDROCHLORIDE 200 MG/1
TABLET, FILM COATED ORAL
Status: DISPENSED
Start: 2018-01-12

## (undated) RX ORDER — ONDANSETRON 2 MG/ML
INJECTION INTRAMUSCULAR; INTRAVENOUS
Status: DISPENSED
Start: 2018-01-12

## (undated) RX ORDER — CEFAZOLIN SODIUM 1 G/3ML
INJECTION, POWDER, FOR SOLUTION INTRAMUSCULAR; INTRAVENOUS
Status: DISPENSED
Start: 2023-09-11

## (undated) RX ORDER — DEXAMETHASONE SODIUM PHOSPHATE 4 MG/ML
INJECTION, SOLUTION INTRA-ARTICULAR; INTRALESIONAL; INTRAMUSCULAR; INTRAVENOUS; SOFT TISSUE
Status: DISPENSED
Start: 2018-01-12

## (undated) RX ORDER — OXYCODONE HYDROCHLORIDE 5 MG/1
TABLET ORAL
Status: DISPENSED
Start: 2023-09-11

## (undated) RX ORDER — DEXAMETHASONE SODIUM PHOSPHATE 4 MG/ML
INJECTION, SOLUTION INTRA-ARTICULAR; INTRALESIONAL; INTRAMUSCULAR; INTRAVENOUS; SOFT TISSUE
Status: DISPENSED
Start: 2023-09-11